# Patient Record
Sex: FEMALE | Race: WHITE | NOT HISPANIC OR LATINO | Employment: FULL TIME | URBAN - METROPOLITAN AREA
[De-identification: names, ages, dates, MRNs, and addresses within clinical notes are randomized per-mention and may not be internally consistent; named-entity substitution may affect disease eponyms.]

---

## 2021-11-05 ENCOUNTER — OFFICE VISIT (OUTPATIENT)
Dept: ORTHOPEDIC SURGERY | Facility: CLINIC | Age: 61
End: 2021-11-05

## 2021-11-05 VITALS — HEIGHT: 62 IN | WEIGHT: 150.2 LBS | BODY MASS INDEX: 27.64 KG/M2 | TEMPERATURE: 98 F

## 2021-11-05 DIAGNOSIS — M25.512 BILATERAL SHOULDER PAIN, UNSPECIFIED CHRONICITY: Primary | ICD-10-CM

## 2021-11-05 DIAGNOSIS — M19.019 GLENOHUMERAL ARTHRITIS: ICD-10-CM

## 2021-11-05 DIAGNOSIS — M89.9 LESION OF BONE OF RIGHT SHOULDER: ICD-10-CM

## 2021-11-05 DIAGNOSIS — M25.511 BILATERAL SHOULDER PAIN, UNSPECIFIED CHRONICITY: Primary | ICD-10-CM

## 2021-11-05 PROCEDURE — 20610 DRAIN/INJ JOINT/BURSA W/O US: CPT | Performed by: ORTHOPAEDIC SURGERY

## 2021-11-05 PROCEDURE — 99204 OFFICE O/P NEW MOD 45 MIN: CPT | Performed by: ORTHOPAEDIC SURGERY

## 2021-11-05 PROCEDURE — 73030 X-RAY EXAM OF SHOULDER: CPT | Performed by: ORTHOPAEDIC SURGERY

## 2021-11-05 RX ORDER — GABAPENTIN 300 MG/1
300 CAPSULE ORAL
COMMUNITY
Start: 2021-08-09 | End: 2023-02-10

## 2021-11-05 RX ORDER — METHYLPREDNISOLONE ACETATE 80 MG/ML
80 INJECTION, SUSPENSION INTRA-ARTICULAR; INTRALESIONAL; INTRAMUSCULAR; SOFT TISSUE
Status: COMPLETED | OUTPATIENT
Start: 2021-11-05 | End: 2021-11-05

## 2021-11-05 RX ORDER — LIDOCAINE HYDROCHLORIDE 20 MG/ML
4 INJECTION, SOLUTION EPIDURAL; INFILTRATION; INTRACAUDAL; PERINEURAL
Status: COMPLETED | OUTPATIENT
Start: 2021-11-05 | End: 2021-11-05

## 2021-11-05 RX ORDER — TRAMADOL HYDROCHLORIDE 50 MG/1
50 TABLET ORAL DAILY
COMMUNITY
Start: 2021-09-30

## 2021-11-05 RX ADMIN — LIDOCAINE HYDROCHLORIDE 4 ML: 20 INJECTION, SOLUTION EPIDURAL; INFILTRATION; INTRACAUDAL; PERINEURAL at 15:11

## 2021-11-05 RX ADMIN — METHYLPREDNISOLONE ACETATE 80 MG: 80 INJECTION, SUSPENSION INTRA-ARTICULAR; INTRALESIONAL; INTRAMUSCULAR; SOFT TISSUE at 15:11

## 2021-11-05 NOTE — PROGRESS NOTES
New bilateral Shoulders      Patient: Ellyn Sanchez        YOB: 1960    Medical Record Number: [id number]        Chief Complaints: bilateral shoulder pain      History of Present Illness: This is a 61-year-old female who presents complaining of bilateral shoulder pain she is right-hand dominant right is much worse than left she states this is been ongoing since 2015 she saw Dr. Richard and then after that saw Dr. Bradford.  She is never had an injection she was told she had arthritis after an MRI and that there was nothing they could do until she needed a shoulder replacement.  Right is an 8 out of 10 for his left is a 4 out of 10 symptoms are severe 8 out of 10 shooting aching burning worse with activity somewhat better with rest her past medical history is remarkable for thyroid disease      Allergies:   Allergies   Allergen Reactions   • Celecoxib GI Intolerance       Medications:   Home Medications:  Current Outpatient Medications on File Prior to Visit   Medication Sig   • fluticasone (FLONASE) 50 MCG/ACT nasal spray 1 spray into each nostril daily as needed. Spray in each nostril.   • gabapentin (NEURONTIN) 300 MG capsule Take 300 mg by mouth every night at bedtime.   • levothyroxine (SYNTHROID, LEVOTHROID) 75 MCG tablet TAKE 1 TABLET BY MOUTH ONE TIME A DAY    • Naproxen Sodium (ALEVE) 220 MG capsule Take 2 capsules by mouth daily as needed.   • traMADol (ULTRAM) 50 MG tablet Take 50 mg by mouth Daily.   • methocarbamol (ROBAXIN) 750 MG tablet Take 2 tablets three or four times daily as needed for back pain.   • naproxen (EC NAPROSYN) 500 MG EC tablet May take 1 tablet twice daily with food as needed for pain.   • valACYclovir (VALTREX) 500 MG tablet Take  by mouth. Take as directed for cold sores and genital herpes.     No current facility-administered medications on file prior to visit.     Current Medications:  Scheduled Meds:  Continuous Infusions:No current facility-administered  "medications for this visit.    PRN Meds:.    Past Medical History:   Diagnosis Date   • Disease of thyroid gland         Past Surgical History:   Procedure Laterality Date   • APPENDECTOMY          Social History     Occupational History   • Not on file   Tobacco Use   • Smoking status: Former Smoker   • Smokeless tobacco: Never Used   Vaping Use   • Vaping Use: Never used   Substance and Sexual Activity   • Alcohol use: Yes     Comment: Socially   • Drug use: Defer   • Sexual activity: Defer      Social History     Social History Narrative   • Not on file        Family History   Problem Relation Age of Onset   • Dementia Mother    • Diabetes Mother    • Heart disease Mother    • COPD Father         with Lewy bodies   • Diabetes Father    • Heart disease Father    • Hypertension Father    • Obesity Father         Morbid   • Arthritis Father    • Cancer Father         big toe   • Diabetes Maternal Grandmother    • Diabetes Sister    • Heart disease Brother              Review of Systems: 14 point review of systems are marked for the pertinent positives listed in the chart by the patient remainder negative    Review of Systems      Physical Exam: 61 y.o. female  General Appearance:    Alert, cooperative, in no acute distress                   Vitals:    11/05/21 1456   Temp: 98 °F (36.7 °C)   Weight: 68.1 kg (150 lb 3.2 oz)   Height: 157.5 cm (62\")   PainSc:   8      Patient is alert and read ×3 no acute distress appears her above-listed at height weight and age.  Affect is normal respiratory rate is normal unlabored. Heart rate regular rate rhythm, sclera, dentition and hearing are normal for the purpose of this exam.    Ortho Exam Physical exam the right shoulder reveals no overlying skin changes no lymphedema lymphadenopathy the patient can actively flex to about 150 passively I get them to 160 abduction is similar external rotation is 40 internal rotation to there buttock.  Rotator cuff strength is 4+ over 5 with " isometric strength testing no overlying skin changes.  Patient has reasonable cervical range of motion for their age no radicular symptoms and a normal elbow exam.  There are good distal pulses.    Physical exam of the left shoulder reveals no overlying skin changes no lymphedema no lymphadenopathy.  Patient has active flexion 170 with mild symptoms abduction is similar external rotation is to 45 and internal rotation to the upper lumbar spine with mild symptoms.  Patient has good rotator cuff strength 4+ over 5 with isometric strength testing with pain.  Patient has a positive impingement and a positive White sign.  Patient has good cervical range of motion which is full and asymptomatic no radicular symptoms.  Patient has a normal elbow exam.  Good distal pulses are presentPatient has pain with overhead activity and a positive Neer sign and a positive empty can sign  They have a positive drop arm any definitive painful arc    Large Joint Arthrocentesis: L glenohumeral  Date/Time: 11/5/2021 3:11 PM  Consent given by: patient  Site marked: site marked  Timeout: Immediately prior to procedure a time out was called to verify the correct patient, procedure, equipment, support staff and site/side marked as required   Supporting Documentation  Indications: pain   Procedure Details  Location: shoulder - L glenohumeral  Preparation: Patient was prepped and draped in the usual sterile fashion  Needle size: 22 G  Approach: posterior  Medications administered: 80 mg methylPREDNISolone acetate 80 MG/ML; 4 mL lidocaine PF 2% 2 %  Patient tolerance: patient tolerated the procedure well with no immediate complications    Large Joint Arthrocentesis: R glenohumeral  Date/Time: 11/5/2021 3:11 PM  Consent given by: patient  Site marked: site marked  Timeout: Immediately prior to procedure a time out was called to verify the correct patient, procedure, equipment, support staff and site/side marked as required   Supporting  Documentation  Indications: pain   Procedure Details  Location: shoulder - R glenohumeral  Preparation: Patient was prepped and draped in the usual sterile fashion  Needle size: 22 G  Approach: posterior  Medications administered: 80 mg methylPREDNISolone acetate 80 MG/ML; 4 mL lidocaine PF 2% 2 %  Patient tolerance: patient tolerated the procedure well with no immediate complications                Radiology:   AP, Scapular Y and Axillary Lateral of the right and left shoulder were ordered/reviewed to evauate shoulder pain.  She does have degenerative changes right is much worse than left with marked narrowing of her glenohumeral space on the left she has a large osteophyte sitting inferiorly but better maintenance of her glenohumeral space.  On the right she does have a small lytic lesion in the scapula that looks like it may have a little bit of sclerosis around it but I think we should work this up  Imaging Results (Most Recent)     Procedure Component Value Units Date/Time    XR Shoulder 2+ View Bilateral [[id number]] Resulted: 11/05/21 1359     Updated: 11/05/21 1459    Impression:      Ordering physician's impression is located in the Encounter Note dated 11/05/21. X-ray performed in the DR room.          Assessment/Plan: Bilateral shoulder pain I do think is degenerative in origin I think she deftly benefit from an injection will do a glenohumeral injection on both I will work-up the right shoulder with MR with IV contrast  Cortisone Injection. See procedure note.  Cortisone Injection for DIAGNOSTIC and THERAPUTIC purposes.

## 2021-12-10 ENCOUNTER — HOSPITAL ENCOUNTER (OUTPATIENT)
Dept: MRI IMAGING | Facility: HOSPITAL | Age: 61
Discharge: HOME | End: 2021-12-10
Admitting: ORTHOPAEDIC SURGERY

## 2021-12-10 DIAGNOSIS — M89.9 LESION OF BONE OF RIGHT SHOULDER: ICD-10-CM

## 2021-12-10 PROCEDURE — 0 GADOBENATE DIMEGLUMINE 529 MG/ML SOLUTION: Performed by: ORTHOPAEDIC SURGERY

## 2021-12-10 PROCEDURE — A9577 INJ MULTIHANCE: HCPCS | Performed by: ORTHOPAEDIC SURGERY

## 2021-12-10 PROCEDURE — 73223 MRI JOINT UPR EXTR W/O&W/DYE: CPT

## 2021-12-10 RX ADMIN — GADOBENATE DIMEGLUMINE 14 ML: 529 INJECTION, SOLUTION INTRAVENOUS at 14:26

## 2021-12-13 ENCOUNTER — TELEPHONE (OUTPATIENT)
Dept: ORTHOPEDIC SURGERY | Facility: CLINIC | Age: 61
End: 2021-12-13

## 2021-12-13 NOTE — TELEPHONE ENCOUNTER
----- Message from Samantha Wan MD sent at 12/13/2021  7:48 AM EST -----  Please tell her there are no bony lesions all of that looks good, rotator cuff looks good.  She has significant arthritis in the glenohumeral joint.  Treatment for that would be injections and if that fails replacement

## 2022-01-28 ENCOUNTER — TELEPHONE (OUTPATIENT)
Dept: ORTHOPEDIC SURGERY | Facility: CLINIC | Age: 62
End: 2022-01-28

## 2022-01-28 NOTE — TELEPHONE ENCOUNTER
Caller: CAITIE CROWDER    Relationship: SELF     Best call back number: [phone]    What was the call regarding: PATIENT  IS REQUESTING TO SCHEDULE BILATERAL SHOULDER INJECTIONS     Do you require a callback: YES

## 2022-02-16 NOTE — PROGRESS NOTES
Patient: Ellyn Sanchez  YOB: 1960  Date of Service: 2/16/2022    Chief Complaints: Bilateral shoulder pain    Subjective:    History of Present Illness: Pt is seen in the office today with complaints of bilateral shoulder pain she has known degenerative changes gets intermittent injections as well with those she is not interested in anything surgical at this time.          Allergies:   Allergies   Allergen Reactions   • Celecoxib GI Intolerance       Medications:   Home Medications:  Current Outpatient Medications on File Prior to Visit   Medication Sig   • fluticasone (FLONASE) 50 MCG/ACT nasal spray 1 spray into each nostril daily as needed. Spray in each nostril.   • gabapentin (NEURONTIN) 300 MG capsule Take 300 mg by mouth every night at bedtime.   • levothyroxine (SYNTHROID, LEVOTHROID) 75 MCG tablet TAKE 1 TABLET BY MOUTH ONE TIME A DAY    • methocarbamol (ROBAXIN) 750 MG tablet Take 2 tablets three or four times daily as needed for back pain.   • naproxen (EC NAPROSYN) 500 MG EC tablet May take 1 tablet twice daily with food as needed for pain.   • traMADol (ULTRAM) 50 MG tablet Take 50 mg by mouth Daily.   • valACYclovir (VALTREX) 500 MG tablet Take  by mouth. Take as directed for cold sores and genital herpes.     No current facility-administered medications on file prior to visit.     Current Medications:  Scheduled Meds:  Continuous Infusions:No current facility-administered medications for this visit.    PRN Meds:.    I have reviewed the patient's medical history in detail and updated the computerized patient record.  Review and summarization of old records include:    Past Medical History:   Diagnosis Date   • Disease of thyroid gland    • Kidney stone         Past Surgical History:   Procedure Laterality Date   • APPENDECTOMY     • KIDNEY STONE SURGERY  2017        Social History     Occupational History   • Not on file   Tobacco Use   • Smoking status: Former Smoker   • Smokeless  tobacco: Never Used   Vaping Use   • Vaping Use: Never used   Substance and Sexual Activity   • Alcohol use: Yes     Comment: Socially   • Drug use: Defer   • Sexual activity: Defer      Social History     Social History Narrative   • Not on file        Family History   Problem Relation Age of Onset   • Dementia Mother    • Diabetes Mother    • Heart disease Mother    • COPD Father         with Lewy bodies   • Diabetes Father    • Heart disease Father    • Hypertension Father    • Obesity Father         Morbid   • Arthritis Father    • Cancer Father         big toe   • Diabetes Maternal Grandmother    • Diabetes Sister    • Heart disease Brother        ROS: 14 point review of systems was performed and was negative except for documented findings in HPI and today's encounter.     Allergies:   Allergies   Allergen Reactions   • Celecoxib GI Intolerance     Constitutional:  Denies fever, shaking or chills   Eyes:  Denies change in visual acuity   HENT:  Denies nasal congestion or sore throat   Respiratory:  Denies cough or shortness of breath   Cardiovascular:  Denies chest pain or severe LE edema   GI:  Denies abdominal pain, nausea, vomiting, bloody stools or diarrhea   Musculoskeletal:  Numbness, tingling, or loss of motor function only as noted above in history of present illness.  : Denies painful urination or hematuria  Integument:  Denies rash, lesion or ulceration   Neurologic:  Denies headache or focal weakness  Endocrine:  Denies lymphadenopathy  Psych:  Denies confusion or change in mental status   Hem:  Denies active bleeding      Physical Exam: 61 y.o. female  Wt Readings from Last 3 Encounters:   11/05/21 68.1 kg (150 lb 3.2 oz)   09/24/15 59 kg (130 lb 0.1 oz)   07/20/15 60.9 kg (134 lb 4.2 oz)       There is no height or weight on file to calculate BMI.  No height and weight on file for this encounter.  There were no vitals filed for this visit.  Vital signs reviewed.   General Appearance:    Alert,  cooperative, in no acute distress                    Ortho exam    Exam unchanged         .time    Assessment: Bilateral shoulder DJD    Plan: Injection she understands her options of arthroplasty  Follow up as indicated.  Ice, elevate, and rest as needed.  Discussed conservative measures of pain control including ice, bracing.  Also talked about the importance of strengthening and maintaining ideal body weight    Samantha Wan M.D.    Large Joint Arthrocentesis: L glenohumeral  Date/Time: 2/18/2022 9:45 AM  Consent given by: patient  Site marked: site marked  Timeout: Immediately prior to procedure a time out was called to verify the correct patient, procedure, equipment, support staff and site/side marked as required   Supporting Documentation  Indications: pain   Procedure Details  Location: shoulder - L glenohumeral  Preparation: Patient was prepped and draped in the usual sterile fashion  Needle gauge: 21G.  Approach: posterior  Medications administered: 80 mg methylPREDNISolone acetate 80 MG/ML; 4 mL lidocaine (cardiac)  Patient tolerance: patient tolerated the procedure well with no immediate complications    Large Joint Arthrocentesis: R glenohumeral  Date/Time: 2/18/2022 9:46 AM  Consent given by: patient  Site marked: site marked  Timeout: Immediately prior to procedure a time out was called to verify the correct patient, procedure, equipment, support staff and site/side marked as required   Supporting Documentation  Indications: pain   Procedure Details  Location: shoulder - R glenohumeral  Preparation: Patient was prepped and draped in the usual sterile fashion  Needle gauge: 21G.  Approach: posterior  Medications administered: 80 mg methylPREDNISolone acetate 80 MG/ML; 4 mL lidocaine (cardiac)  Patient tolerance: patient tolerated the procedure well with no immediate complications

## 2022-02-18 ENCOUNTER — CLINICAL SUPPORT (OUTPATIENT)
Dept: ORTHOPEDIC SURGERY | Facility: CLINIC | Age: 62
End: 2022-02-18

## 2022-02-18 VITALS — HEIGHT: 62 IN | RESPIRATION RATE: 16 BRPM | WEIGHT: 150 LBS | BODY MASS INDEX: 27.6 KG/M2 | TEMPERATURE: 97.3 F

## 2022-02-18 DIAGNOSIS — M19.019 GLENOHUMERAL ARTHRITIS: Primary | ICD-10-CM

## 2022-02-18 PROCEDURE — 20610 DRAIN/INJ JOINT/BURSA W/O US: CPT | Performed by: ORTHOPAEDIC SURGERY

## 2022-02-18 RX ORDER — METHYLPREDNISOLONE ACETATE 80 MG/ML
80 INJECTION, SUSPENSION INTRA-ARTICULAR; INTRALESIONAL; INTRAMUSCULAR; SOFT TISSUE
Status: COMPLETED | OUTPATIENT
Start: 2022-02-18 | End: 2022-02-18

## 2022-02-18 RX ORDER — AZELASTINE HCL 205.5 UG/1
2 SPRAY NASAL 2 TIMES DAILY
COMMUNITY
Start: 2022-02-02

## 2022-02-18 RX ORDER — GABAPENTIN 600 MG/1
600 TABLET ORAL
COMMUNITY
Start: 2022-02-10

## 2022-02-18 RX ADMIN — METHYLPREDNISOLONE ACETATE 80 MG: 80 INJECTION, SUSPENSION INTRA-ARTICULAR; INTRALESIONAL; INTRAMUSCULAR; SOFT TISSUE at 09:45

## 2022-02-18 RX ADMIN — METHYLPREDNISOLONE ACETATE 80 MG: 80 INJECTION, SUSPENSION INTRA-ARTICULAR; INTRALESIONAL; INTRAMUSCULAR; SOFT TISSUE at 09:46

## 2022-05-31 ENCOUNTER — TELEPHONE (OUTPATIENT)
Dept: ORTHOPEDIC SURGERY | Facility: CLINIC | Age: 62
End: 2022-05-31

## 2022-05-31 NOTE — TELEPHONE ENCOUNTER
Caller: Ellyn Sanchez    Relationship to patient: Self    Best call back number:     Chief complaint: BILAT SHOULDER INJECTION     Type of visit: P

## 2022-06-03 ENCOUNTER — CLINICAL SUPPORT (OUTPATIENT)
Dept: ORTHOPEDIC SURGERY | Facility: CLINIC | Age: 62
End: 2022-06-03

## 2022-06-03 VITALS — TEMPERATURE: 97.2 F | HEIGHT: 62 IN | BODY MASS INDEX: 27.53 KG/M2 | WEIGHT: 149.6 LBS

## 2022-06-03 DIAGNOSIS — M19.019 GLENOHUMERAL ARTHRITIS: Primary | ICD-10-CM

## 2022-06-03 PROCEDURE — 20610 DRAIN/INJ JOINT/BURSA W/O US: CPT | Performed by: ORTHOPAEDIC SURGERY

## 2022-06-03 RX ADMIN — LIDOCAINE HYDROCHLORIDE 4 ML: 20 INJECTION, SOLUTION EPIDURAL; INFILTRATION; INTRACAUDAL; PERINEURAL at 08:10

## 2022-06-03 RX ADMIN — METHYLPREDNISOLONE ACETATE 80 MG: 80 INJECTION, SUSPENSION INTRA-ARTICULAR; INTRALESIONAL; INTRAMUSCULAR; SOFT TISSUE at 08:09

## 2022-06-03 RX ADMIN — METHYLPREDNISOLONE ACETATE 80 MG: 80 INJECTION, SUSPENSION INTRA-ARTICULAR; INTRALESIONAL; INTRAMUSCULAR; SOFT TISSUE at 08:10

## 2022-06-03 RX ADMIN — LIDOCAINE HYDROCHLORIDE 4 ML: 20 INJECTION, SOLUTION EPIDURAL; INFILTRATION; INTRACAUDAL; PERINEURAL at 08:09

## 2022-06-03 NOTE — PROGRESS NOTES
Patient: Ellyn Sanchez  YOB: 1960  Date of Service: 6/3/2022    Chief Complaints: Bilateral shoulder pain    Subjective:    History of Present Illness: Pt is seen in the office today with complaints of bilateral shoulder pain she gets intermittent injections she has known degenerative changes she is not interested in think surgical but does understand her options.          Allergies:   Allergies   Allergen Reactions   • Celecoxib GI Intolerance       Medications:   Home Medications:  Current Outpatient Medications on File Prior to Visit   Medication Sig   • azelastine (ASTEPRO) 0.15 % solution nasal spray 2 sprays 2 (Two) Times a Day.   • fluticasone (FLONASE) 50 MCG/ACT nasal spray 1 spray into each nostril daily as needed. Spray in each nostril.   • gabapentin (NEURONTIN) 300 MG capsule Take 300 mg by mouth every night at bedtime.   • gabapentin (NEURONTIN) 600 MG tablet Take 600 mg by mouth every night at bedtime.   • levothyroxine (SYNTHROID, LEVOTHROID) 75 MCG tablet TAKE 1 TABLET BY MOUTH ONE TIME A DAY    • methocarbamol (ROBAXIN) 750 MG tablet Take 2 tablets three or four times daily as needed for back pain.   • naproxen (EC NAPROSYN) 500 MG EC tablet May take 1 tablet twice daily with food as needed for pain.   • traMADol (ULTRAM) 50 MG tablet Take 50 mg by mouth Daily.   • valACYclovir (VALTREX) 500 MG tablet Take  by mouth. Take as directed for cold sores and genital herpes.     No current facility-administered medications on file prior to visit.     Current Medications:  Scheduled Meds:  Continuous Infusions:No current facility-administered medications for this visit.    PRN Meds:.    I have reviewed the patient's medical history in detail and updated the computerized patient record.  Review and summarization of old records include:    Past Medical History:   Diagnosis Date   • Disease of thyroid gland    • Kidney stone         Past Surgical History:   Procedure Laterality Date   •  APPENDECTOMY     • KIDNEY STONE SURGERY  2017        Social History     Occupational History   • Not on file   Tobacco Use   • Smoking status: Former Smoker   • Smokeless tobacco: Never Used   Vaping Use   • Vaping Use: Never used   Substance and Sexual Activity   • Alcohol use: Yes     Comment: Socially   • Drug use: Defer   • Sexual activity: Defer      Social History     Social History Narrative   • Not on file        Family History   Problem Relation Age of Onset   • Dementia Mother    • Diabetes Mother    • Heart disease Mother    • COPD Father         with Lewy bodies   • Diabetes Father    • Heart disease Father    • Hypertension Father    • Obesity Father         Morbid   • Arthritis Father    • Cancer Father         big toe   • Diabetes Maternal Grandmother    • Diabetes Sister    • Heart disease Brother        ROS: 14 point review of systems was performed and was negative except for documented findings in HPI and today's encounter.     Allergies:   Allergies   Allergen Reactions   • Celecoxib GI Intolerance     Constitutional:  Denies fever, shaking or chills   Eyes:  Denies change in visual acuity   HENT:  Denies nasal congestion or sore throat   Respiratory:  Denies cough or shortness of breath   Cardiovascular:  Denies chest pain or severe LE edema   GI:  Denies abdominal pain, nausea, vomiting, bloody stools or diarrhea   Musculoskeletal:  Numbness, tingling, or loss of motor function only as noted above in history of present illness.  : Denies painful urination or hematuria  Integument:  Denies rash, lesion or ulceration   Neurologic:  Denies headache or focal weakness  Endocrine:  Denies lymphadenopathy  Psych:  Denies confusion or change in mental status   Hem:  Denies active bleeding      Physical Exam: 61 y.o. female  Wt Readings from Last 3 Encounters:   02/18/22 68 kg (150 lb)   11/05/21 68.1 kg (150 lb 3.2 oz)   09/24/15 59 kg (130 lb 0.1 oz)       There is no height or weight on file to  calculate BMI.  No height and weight on file for this encounter.  There were no vitals filed for this visit.  Vital signs reviewed.   General Appearance:    Alert, cooperative, in no acute distress                    Ortho exam    Exam is unchanged         .time    Assessment: Bilateral shoulder DJD I did review x-rays from last visit she has degenerative changes in both    Plan: Injections in both she understands her surgical options  Follow up as indicated.  Ice, elevate, and rest as needed.  Discussed conservative measures of pain control including ice, bracing.  Also talked about the importance of strengthening   Samantha Wan M.D.      Large Joint Arthrocentesis: L glenohumeral  Date/Time: 6/3/2022 8:09 AM  Consent given by: patient  Site marked: site marked  Timeout: Immediately prior to procedure a time out was called to verify the correct patient, procedure, equipment, support staff and site/side marked as required   Supporting Documentation  Indications: pain   Procedure Details  Location: shoulder - L glenohumeral  Preparation: Patient was prepped and draped in the usual sterile fashion  Needle gauge: 21G.  Approach: posterior  Medications administered: 80 mg methylPREDNISolone acetate 80 MG/ML; 4 mL lidocaine PF 2% 2 %  Patient tolerance: patient tolerated the procedure well with no immediate complications    Large Joint Arthrocentesis: R glenohumeral  Date/Time: 6/3/2022 8:10 AM  Consent given by: patient  Site marked: site marked  Timeout: Immediately prior to procedure a time out was called to verify the correct patient, procedure, equipment, support staff and site/side marked as required   Supporting Documentation  Indications: pain   Procedure Details  Location: shoulder - R glenohumeral  Preparation: Patient was prepped and draped in the usual sterile fashion  Needle gauge: 21G.  Approach: posterior  Medications administered: 80 mg methylPREDNISolone acetate 80 MG/ML; 4 mL lidocaine PF 2% 2  %  Patient tolerance: patient tolerated the procedure well with no immediate complications

## 2022-06-07 RX ORDER — METHYLPREDNISOLONE ACETATE 80 MG/ML
80 INJECTION, SUSPENSION INTRA-ARTICULAR; INTRALESIONAL; INTRAMUSCULAR; SOFT TISSUE
Status: COMPLETED | OUTPATIENT
Start: 2022-06-03 | End: 2022-06-03

## 2022-06-07 RX ORDER — LIDOCAINE HYDROCHLORIDE 20 MG/ML
4 INJECTION, SOLUTION EPIDURAL; INFILTRATION; INTRACAUDAL; PERINEURAL
Status: COMPLETED | OUTPATIENT
Start: 2022-06-03 | End: 2022-06-03

## 2022-08-19 ENCOUNTER — TELEPHONE (OUTPATIENT)
Dept: ORTHOPEDIC SURGERY | Facility: CLINIC | Age: 62
End: 2022-08-19

## 2022-08-19 NOTE — TELEPHONE ENCOUNTER
HUB AGENT ATTEMPTED NON-CLINICAL WARM TRANSFER - NO ANSWER     Caller: Ellyn Sanchez    Relationship to patient: Self    Best call back number: [phone]     Type of visit: FOLLOW UP / BILATERAL SHOULDERS, LEFT > RIGHT / NO NEW INJURIES / WANTS INJECTION (PRIOR BILATERAL SHOULDERS CORTISONE 06-03-22)    Requested date: FRI 09-02-22 PREFERS MORNINGS @ [facility] REQUESTED [facility]     PLEASE CALL / LEAVE VMAIL TO DISCUSS SCHEDULING     THANKS

## 2022-08-24 ENCOUNTER — TELEPHONE (OUTPATIENT)
Dept: ORTHOPEDIC SURGERY | Facility: CLINIC | Age: 62
End: 2022-08-24

## 2022-08-24 NOTE — TELEPHONE ENCOUNTER
Caller: CAITIE CROWDER    Relationship to patient: SELF    Best call back number: [phone]    Chief complaint: BILATERAL SHOULDER PAIN    Type of visit: INJECTION    Requested date: 09/22/22     If rescheduling, when is the original appointment: 09/02/2022 JUST A LITTLE LATER IN THE DAY     Additional notes:PATIENT REQ APPT ON SAME DATE OF 09/02/2022 BUT LATER IN THE DAY

## 2022-09-01 NOTE — PROGRESS NOTES
[facility] [place] [place] [facility]  Follow Up Visit      Patient Name: Ellyn Sanchez  : 1960  Primary Care Physician: Irene Wilson APRN        Chief Complaint: Bilateral shoulder pain     HPI:   Ellyn Sanchez is a 62 y.o. year old who presents today for bilateral shoulder pain.  Patient has a history of chronic bilateral shoulder pain and glenohumeral arthritis.  In the past she seen Dr. Wan for her symptoms, she comes in today with reports that her symptoms are worsening particularly the left shoulder.  Historically her right shoulder was more symptomatic.  She has been taking 2 Aleve a day, she takes gabapentin at bedtime as well as tramadol all without much relief in her symptoms.  Her symptoms are constant, pretty much all day long and she does have nighttime symptoms.  We elected to go ahead and get new x-rays today for further evaluation and treatment.  She has no specific radicular pattern, she does have a little neck pain sometimes.    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  She does feel this likely exacerbates her symptoms.    ROS:  ROS negative except as listed in the HPI.    Allergies:   Allergies   Allergen Reactions   • Celecoxib GI Intolerance       Medications:  Current Outpatient Medications on File Prior to Visit   Medication Sig   • azelastine (ASTEPRO) 0.15 % solution nasal spray 2 sprays 2 (Two) Times a Day.   • fluticasone (FLONASE) 50 MCG/ACT nasal spray 1 spray into each nostril daily as needed. Spray in each nostril.   • gabapentin (NEURONTIN) 600 MG tablet Take 600 mg by mouth every night at bedtime.   • levothyroxine (SYNTHROID, LEVOTHROID) 75 MCG tablet TAKE 1 TABLET BY MOUTH ONE TIME A DAY    • traMADol (ULTRAM) 50 MG tablet Take 50 mg by mouth Daily.   • valACYclovir (VALTREX) 500 MG tablet Take  by mouth. Take as directed for cold sores and genital herpes.   • gabapentin  (NEURONTIN) 300 MG capsule Take 300 mg by mouth every night at bedtime.   • methocarbamol (ROBAXIN) 750 MG tablet Take 2 tablets three or four times daily as needed for back pain.   • naproxen (EC NAPROSYN) 500 MG EC tablet May take 1 tablet twice daily with food as needed for pain.     No current facility-administered medications on file prior to visit.       Physical Exam:   62 y.o. female  Body mass index is 27.55 kg/m²., 68.3 kg (150 lb 9.6 oz)  Vitals:    09/02/22 1052   Temp: 97.7 °F (36.5 °C)     General: Alert, cooperative, appears well and in no observable distress. Appears stated age and BMI as listed above.  Respiratory: Normal respiratory effort.  Skin: Warm & well perfused; appropriate skin turgor.  Psych: Appropriate mood & affect.    MSK Exam:    Bilateral Shoulder  No obvious deformities or wounds.   No redness or significant swelling.  ROM is limited with a FIRM endpoint.  Flexion 160  ER 60  IR SI region  4+ to 5/5 strength with isometric testing of the rotator cuff.      Brief examination of the cervical spine did not reproduce any specific radicular pattern or symptoms.   Strength is reasonable and symmetric in the upper extremities.      Radiology:    The following X-rays were ordered/reviewed today to evaluate the patient's symptoms: Shoulder: AP, Scapular Y and Axillary Lateral of both shoulder(s) show Degenerative changes of both glenohumeral joints.  I do think overall the right shoulder appears very similar to x-rays taken nearly 10 months ago.  May be perhaps very subtle interval progression.  The left shoulder appears to have more significant changes over the last 10 months with more narrowing noted on the axillary view in the glenohumeral space.  Otherwise no NEW specific acute bony pathology to account for her report of increased pain..    Procedure:   See Procedure Note: The potential risks and benefits of performing a diagnostic and therapeutic injection were discussed with the  patient prior to procedure. Risks include, but are not limited to infection, swelling, transient increase in pain, bleeding, bruising. Patient was advised that injections are a diagnostic and therapeutic tool meaning they may not alleviate symptoms at all, or may only provide partial or temporary relief. Injection precautions and aftercare discussed.      [facility]. Data/Labs: N/A    Assessment & Plan:    This is a 62-year-old female with chronic bilateral shoulder pain and known degenerative changes.  I do think she has had a progression in the arthritis particularly the left shoulder.  Her strength is good on her examination which makes me less suspicious for rotator cuff pathology but I would consider that in the differential and might even consider 1 additional injection into the subacromial space to see if that may alleviate her symptoms a bit better.  She fails to get any relief with these injections this time we might consider an MRI just to rule out any other specific shoulder pathology.  She will continue her usual regimen for pain, she has been icing daily as well.  We will see her back in about 3 months I did tell her to call me sooner if she does not get good relief.          ICD-10-CM ICD-9-CM   1. Bilateral shoulder pain, unspecified chronicity  M25.511 719.41    M25.512    2. Arthritis of both glenohumeral joints  M19.011 716.91    M19.012      No orders of the defined types were placed in this encounter.    Orders Placed This Encounter   Procedures   • Large Joint Arthrocentesis: R glenohumeral   • Large Joint Arthrocentesis: L glenohumeral   • XR Shoulder 2+ View Bilateral     Return in about 3 months (around 12/2/2022).    Patient encouraged to call with questions or concerns prior to follow up.  Recommend ICE and/or HEAT PRN as discussed.  Will discuss with attending physician as needed.  Consider additional referrals, work up and/or advanced imaging as indicated or if patient fails to respond to  conservative care.        VAMSHI Mendoza      Dictated Utilizing Dragon Dictation  Large Joint Arthrocentesis: R glenohumeral  Date/Time: 9/2/2022 10:53 AM  Consent given by: patient  Site marked: site marked  Timeout: Immediately prior to procedure a time out was called to verify the correct patient, procedure, equipment, support staff and site/side marked as required   Supporting Documentation  Indications: pain   Procedure Details  Location: shoulder - R glenohumeral  Preparation: Patient was prepped and draped in the usual sterile fashion  Needle gauge: 21G.  Approach: posterior  Medications administered: 2 mL lidocaine (cardiac); 80 mg methylPREDNISolone acetate 40 MG/ML  Patient tolerance: patient tolerated the procedure well with no immediate complications    Large Joint Arthrocentesis: L glenohumeral  Date/Time: 9/2/2022 10:54 AM  Consent given by: patient  Site marked: site marked  Timeout: Immediately prior to procedure a time out was called to verify the correct patient, procedure, equipment, support staff and site/side marked as required   Supporting Documentation  Indications: pain   Procedure Details  Location: shoulder - L glenohumeral  Preparation: Patient was prepped and draped in the usual sterile fashion  Needle gauge: 21G.  Approach: posterior  Medications administered: 2 mL lidocaine (cardiac); 80 mg methylPREDNISolone acetate 40 MG/ML  Patient tolerance: patient tolerated the procedure well with no immediate complications

## 2022-09-02 ENCOUNTER — CLINICAL SUPPORT (OUTPATIENT)
Dept: ORTHOPEDIC SURGERY | Facility: CLINIC | Age: 62
End: 2022-09-02

## 2022-09-02 VITALS — WEIGHT: 150.6 LBS | BODY MASS INDEX: 27.71 KG/M2 | TEMPERATURE: 97.7 F | HEIGHT: 62 IN

## 2022-09-02 DIAGNOSIS — M19.012 ARTHRITIS OF BOTH GLENOHUMERAL JOINTS: ICD-10-CM

## 2022-09-02 DIAGNOSIS — M25.512 BILATERAL SHOULDER PAIN, UNSPECIFIED CHRONICITY: Primary | ICD-10-CM

## 2022-09-02 DIAGNOSIS — M25.511 BILATERAL SHOULDER PAIN, UNSPECIFIED CHRONICITY: Primary | ICD-10-CM

## 2022-09-02 DIAGNOSIS — M19.011 ARTHRITIS OF BOTH GLENOHUMERAL JOINTS: ICD-10-CM

## 2022-09-02 PROCEDURE — 99213 OFFICE O/P EST LOW 20 MIN: CPT | Performed by: NURSE PRACTITIONER

## 2022-09-02 PROCEDURE — 73030 X-RAY EXAM OF SHOULDER: CPT | Performed by: NURSE PRACTITIONER

## 2022-09-02 PROCEDURE — 20610 DRAIN/INJ JOINT/BURSA W/O US: CPT | Performed by: NURSE PRACTITIONER

## 2022-09-02 RX ORDER — METHYLPREDNISOLONE ACETATE 40 MG/ML
80 INJECTION, SUSPENSION INTRA-ARTICULAR; INTRALESIONAL; INTRAMUSCULAR; SOFT TISSUE
Status: COMPLETED | OUTPATIENT
Start: 2022-09-02 | End: 2022-09-02

## 2022-09-02 RX ADMIN — METHYLPREDNISOLONE ACETATE 80 MG: 40 INJECTION, SUSPENSION INTRA-ARTICULAR; INTRALESIONAL; INTRAMUSCULAR; SOFT TISSUE at 10:53

## 2022-09-02 RX ADMIN — METHYLPREDNISOLONE ACETATE 80 MG: 40 INJECTION, SUSPENSION INTRA-ARTICULAR; INTRALESIONAL; INTRAMUSCULAR; SOFT TISSUE at 10:54

## 2022-11-22 ENCOUNTER — TELEPHONE (OUTPATIENT)
Dept: ORTHOPEDIC SURGERY | Facility: CLINIC | Age: 62
End: 2022-11-22

## 2022-11-22 NOTE — TELEPHONE ENCOUNTER
DELETE AFTER REVIEWING: Telephone encounter to be sent to the clinical pool     Caller: Ellyn Sanchez    Relationship to patient: Self    Best call back number: [phone]    Chief complaint: BILATERAL SHOULDER    Type of visit: INJECTION    Requested date: 12/09 AFTER 12.30PM

## 2023-01-05 NOTE — PROGRESS NOTES
[facility] [place] [facility]  Follow Up Visit      Patient Name: Ellyn Sanchez  : 1960  Primary Care Physician: Irene Wilson APRN        Chief Complaint: Bilateral shoulder pain, new right knee pain    HPI:   Ellyn Sanchez is a 62 y.o. year old who presents today for bilateral shoulder pain and new right knee pain.  Patient has a history of chronic bilateral shoulder pain and known degenerative changes.  She gets intermittent injections with good relief in her symptoms.  She has been evaluated by both Dr. Wan and myself in the past she understands her surgical options and is still getting reasonable relief with intermittent injections.  She is really trying to time any more significant surgery closer to when she retires.  She takes Aleve twice a day as well as tramadol for more severe pain.  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  We elected to get new x-rays of her knees to better evaluate her today.      ROS:  ROS negative except as listed in the HPI.    Allergies:   Allergies   Allergen Reactions   • Celecoxib GI Intolerance       Medications:  Current Outpatient Medications on File Prior to Visit   Medication Sig   • azelastine (ASTEPRO) 0.15 % solution nasal spray 2 sprays 2 (Two) Times a Day.   • fluticasone (FLONASE) 50 MCG/ACT nasal spray 1 spray into each nostril daily as needed. Spray in each nostril.   • gabapentin (NEURONTIN) 600 MG tablet Take 600 mg by mouth every night at bedtime.   • levothyroxine (SYNTHROID, LEVOTHROID) 75 MCG tablet TAKE 1 TABLET BY MOUTH ONE TIME A DAY    • traMADol (ULTRAM) 50 MG tablet Take 50 mg by mouth Daily.   • valACYclovir (VALTREX) 500 MG tablet Take  by mouth. Take as directed for cold sores and genital herpes.   • gabapentin (NEURONTIN) 300 MG capsule Take 300 mg by mouth  every night at bedtime.   • methocarbamol (ROBAXIN) 750 MG tablet Take 2 tablets three or four times daily as needed for back pain.   • naproxen (EC NAPROSYN) 500 MG EC tablet May take 1 tablet twice daily with food as needed for pain.     No current facility-administered medications on file prior to visit.       Physical Exam:   62 y.o. female  Body mass index is 28.2 kg/m²., 69.9 kg (154 lb 3.2 oz)  Vitals:    01/06/23 1348   Temp: 99.1 °F (37.3 °C)     General: Alert, cooperative, appears well and in no observable distress. Appears stated age and BMI as listed above.  Respiratory: Normal respiratory effort.  Skin: Warm & well perfused; appropriate skin turgor.  Psych: Appropriate mood & affect.    MSK Exam:  Physical exam of shoulders is unchanged.      Knee Exam:  Right   No deformity or wounds appreciated. No significant redness or warmth.  Trace effusion noted.  Tenderness along the joint line appreciated laterally.  ROM 0-130 with pain at terminal motion.  Ligamentous exam grossly stable.  Le + medial, mild +  Bounce Home -  Quad strength 4-4+/5    Brief hip exam in the affected extremity(ies) grossly unremarkable.  Moves ankle and toes up and down, no significant pain or swelling in the foot, ankle or calf.        Radiology:    The following X-rays were ordered/reviewed today to evaluate the patient's symptoms: Single Knee: AP standing and sunrise views of both knees, and lateral view of painful knee show Some subtle degenerative changesWith medial compartment narrowing bilaterally which appears symmetric.  She does have some mild patellofemoral arthritis sunrise view is reasonable.  Nothing acute appearing..    Procedure:   See Procedure Note: The potential risks and benefits of performing a diagnostic and therapeutic injection were discussed with the patient prior to procedure. Risks include, but are not limited to infection, swelling, transient increase in pain, bleeding, bruising. Patient was  advised that injections are a diagnostic and therapeutic tool meaning they may not alleviate symptoms at all, or may only provide partial or temporary relief. Injection precautions and aftercare discussed.    Large Joint Arthrocentesis: L glenohumeral  Date/Time: 1/6/2023 1:49 PM  Consent given by: patient  Site marked: site marked  Timeout: Immediately prior to procedure a time out was called to verify the correct patient, procedure, equipment, support staff and site/side marked as required   Supporting Documentation  Indications: pain   Procedure Details  Location: shoulder - L glenohumeral  Preparation: Patient was prepped and draped in the usual sterile fashion  Needle gauge: 21G.  Approach: posterior  Medications administered: 80 mg methylPREDNISolone acetate 80 MG/ML; 2 mL lidocaine PF 1% 1 %  Patient tolerance: patient tolerated the procedure well with no immediate complications    Large Joint Arthrocentesis: R glenohumeral  Date/Time: 1/6/2023 1:50 PM  Consent given by: patient  Site marked: site marked  Timeout: Immediately prior to procedure a time out was called to verify the correct patient, procedure, equipment, support staff and site/side marked as required   Supporting Documentation  Indications: pain   Procedure Details  Location: shoulder - R glenohumeral  Preparation: Patient was prepped and draped in the usual sterile fashion  Needle gauge: 21G.  Approach: posterior  Medications administered: 80 mg methylPREDNISolone acetate 80 MG/ML; 2 mL lidocaine PF 1% 1 %  Patient tolerance: patient tolerated the procedure well with no immediate complications          Misc. Data/Labs: N/A    Assessment & Plan:    ICD-10-CM ICD-9-CM   1. Bilateral shoulder pain, unspecified chronicity  M25.511 719.41    M25.512    2. Arthritis of both glenohumeral joints  M19.011 716.91    M19.012    3. Acute pain of right knee  M25.561 719.46     No orders of the defined types were placed in this encounter.    Orders Placed  This Encounter   Procedures   • Large Joint Arthrocentesis: L glenohumeral   • Large Joint Arthrocentesis: R glenohumeral   • XR Knee 3 View Right       This is a 62-year-old female with bilateral glenohumeral arthritis.  She gets intermittent injections with reasonable relief in her symptoms.  I think ultimately she understands she is heading towards considering shoulder replacement at some point in the future but she is doing well with conservative care still.  Will refer her onto Dr. Hoskins at the time she wishes to start considering surgery a bit more.    As far as her right knee her pain is mostly posterior somewhat lateral this could be meniscal pathology she is got some medial compartment narrowing but the pain seems to be isolated to the knee no dedicated hip or radicular complaints.  She did order an over-the-counter knee brace I think that would be a good option to try I will give her home exercise programs to work on some stretching and strengthening.  We talked about using topical Voltaren as well and she is already on anti-inflammatories.  I will see her back in about 3 months for her shoulders if her knee pain does not respond to the conservative care we might consider an injection or additional means of testing with an MRI.    Return in about 3 months (around 4/6/2023), or if symptoms worsen or fail to improve.    Patient encouraged to call with questions or concerns prior to follow up.  Recommend ICE and/or HEAT PRN as discussed.  Will discuss with attending physician as needed.  Consider additional referrals, work up and/or advanced imaging as indicated or if patient fails to respond to conservative care.        Jose Weeks, APRN

## 2023-01-06 ENCOUNTER — CLINICAL SUPPORT (OUTPATIENT)
Dept: ORTHOPEDIC SURGERY | Facility: CLINIC | Age: 63
End: 2023-01-06
Payer: COMMERCIAL

## 2023-01-06 VITALS — TEMPERATURE: 99.1 F | BODY MASS INDEX: 28.37 KG/M2 | WEIGHT: 154.2 LBS | HEIGHT: 62 IN

## 2023-01-06 DIAGNOSIS — M25.561 ACUTE PAIN OF RIGHT KNEE: ICD-10-CM

## 2023-01-06 DIAGNOSIS — M25.512 BILATERAL SHOULDER PAIN, UNSPECIFIED CHRONICITY: Primary | ICD-10-CM

## 2023-01-06 DIAGNOSIS — M25.511 BILATERAL SHOULDER PAIN, UNSPECIFIED CHRONICITY: Primary | ICD-10-CM

## 2023-01-06 DIAGNOSIS — M19.012 ARTHRITIS OF BOTH GLENOHUMERAL JOINTS: ICD-10-CM

## 2023-01-06 DIAGNOSIS — M19.011 ARTHRITIS OF BOTH GLENOHUMERAL JOINTS: ICD-10-CM

## 2023-01-06 PROCEDURE — 73562 X-RAY EXAM OF KNEE 3: CPT | Performed by: NURSE PRACTITIONER

## 2023-01-06 PROCEDURE — 99213 OFFICE O/P EST LOW 20 MIN: CPT | Performed by: NURSE PRACTITIONER

## 2023-01-06 PROCEDURE — 20610 DRAIN/INJ JOINT/BURSA W/O US: CPT | Performed by: NURSE PRACTITIONER

## 2023-01-06 RX ORDER — LIDOCAINE HYDROCHLORIDE 10 MG/ML
2 INJECTION, SOLUTION EPIDURAL; INFILTRATION; INTRACAUDAL; PERINEURAL
Status: COMPLETED | OUTPATIENT
Start: 2023-01-06 | End: 2023-01-06

## 2023-01-06 RX ORDER — METHYLPREDNISOLONE ACETATE 80 MG/ML
80 INJECTION, SUSPENSION INTRA-ARTICULAR; INTRALESIONAL; INTRAMUSCULAR; SOFT TISSUE
Status: COMPLETED | OUTPATIENT
Start: 2023-01-06 | End: 2023-01-06

## 2023-01-06 RX ADMIN — METHYLPREDNISOLONE ACETATE 80 MG: 80 INJECTION, SUSPENSION INTRA-ARTICULAR; INTRALESIONAL; INTRAMUSCULAR; SOFT TISSUE at 13:50

## 2023-01-06 RX ADMIN — METHYLPREDNISOLONE ACETATE 80 MG: 80 INJECTION, SUSPENSION INTRA-ARTICULAR; INTRALESIONAL; INTRAMUSCULAR; SOFT TISSUE at 13:49

## 2023-01-06 RX ADMIN — LIDOCAINE HYDROCHLORIDE 2 ML: 10 INJECTION, SOLUTION EPIDURAL; INFILTRATION; INTRACAUDAL; PERINEURAL at 13:49

## 2023-01-06 RX ADMIN — LIDOCAINE HYDROCHLORIDE 2 ML: 10 INJECTION, SOLUTION EPIDURAL; INFILTRATION; INTRACAUDAL; PERINEURAL at 13:50

## 2023-02-09 NOTE — PROGRESS NOTES
[facility] [place] [facility]              Follow Up      Patient Name: Ellyn Sanchez  : 1960  Primary Care Physician: Irene Wilson APRN        Chief Complaint: Right knee pain    HPI:   Ellyn Sanchez is a 62 y.o. year old who presents today for evaluation of right knee pain.  Patient reports a history of right knee pain for over 3 months now, I last saw her about a month ago where she reported those complaints initially.  We obtained some x-rays and I had some concerns that this was meniscal pathology but her symptoms were more intermittent.  Since I last saw her she says that her knee pain has become more of a constant source of discomfort, worse with standing walking, twisting motions and she has nighttime symptoms now.  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  She feels like her symptoms really isolated to her knee.  At the last visit I gave her a home exercise program for stretching and strengthening, she takes Aleve regularly.  She gets some relief when she is off of work for a few days.  She got a massage that helped but it was temporary.  She has tried bracing, she takes tramadol as well at night times.  All without sufficient relief and her symptoms are worsening.    Past Medical/Surgical, Social and Family History:  I have reviewed and/or updated pertinent history as noted in the medical record including:  Past Medical History:   Diagnosis Date   • Disease of thyroid gland    • Kidney stone      Past Surgical History:   Procedure Laterality Date   • APPENDECTOMY     • KIDNEY STONE SURGERY  2017     Social History     Occupational History   • Not on file   Tobacco Use   • Smoking status: Former   • Smokeless tobacco: Never   Vaping Use   • Vaping Use:  Never used   Substance and Sexual Activity   • Alcohol use: Yes     Comment: Socially   • Drug use: Defer   • Sexual activity: Defer          Allergies:   Allergies   Allergen Reactions   • Celecoxib GI Intolerance       Medications:   Home Medications:  Current Outpatient Medications on File Prior to Visit   Medication Sig   • azelastine (ASTEPRO) 0.15 % solution nasal spray 2 sprays 2 (Two) Times a Day.   • fluticasone (FLONASE) 50 MCG/ACT nasal spray 1 spray into each nostril daily as needed. Spray in each nostril.   • gabapentin (NEURONTIN) 600 MG tablet Take 600 mg by mouth every night at bedtime.   • levothyroxine (SYNTHROID, LEVOTHROID) 75 MCG tablet TAKE 1 TABLET BY MOUTH ONE TIME A DAY    • naproxen sodium (ALEVE) 220 MG tablet Take 220 mg by mouth 2 (Two) Times a Day As Needed.   • traMADol (ULTRAM) 50 MG tablet Take 50 mg by mouth Daily.   • valACYclovir (VALTREX) 500 MG tablet Take  by mouth. Take as directed for cold sores and genital herpes.   • [DISCONTINUED] gabapentin (NEURONTIN) 300 MG capsule Take 300 mg by mouth every night at bedtime.   • [DISCONTINUED] methocarbamol (ROBAXIN) 750 MG tablet Take 2 tablets three or four times daily as needed for back pain.   • [DISCONTINUED] naproxen (EC NAPROSYN) 500 MG EC tablet May take 1 tablet twice daily with food as needed for pain.     No current facility-administered medications on file prior to visit.         ROS:  ROS negative except as listed in the HPI.    Physical Exam:   62 y.o. female  Body mass index is 28.19 kg/m²., 69.9 kg (154 lb 1.6 oz)  Vitals:    02/10/23 0949   Temp: 98.2 °F (36.8 °C)     General: Alert, cooperative, appears well and in no observable distress. Appears stated age and BMI as listed above.  HEENT: Normocephalic, atraumatic on external visual inspection.  CV: No significant peripheral edema.  Respiratory: Normal respiratory effort.  Skin: Warm & well perfused; appropriate skin turgor.  Psych: Appropriate mood & affect.  Neuro:  Gross sensation and motor intact in affected extremity/extremities.  Vascular: Peripheral pulses palpable in affected extremity/extremities.     MSK Exam:    Knee Exam:  Right   No deformity or wounds appreciated. No significant redness or warmth.  Trace effusion noted.  Tenderness along the joint line appreciated laterally.  ROM 0-130 with pain at terminal motion.  Ligamentous exam grossly stable.  Le + lateral  Bounce Home +  Quad strength 4-4+/5    Left   No deformity or wounds appreciated. No significant redness or warmth.  No significant effusion noted.  No significant tenderness appreciated about the joint.  ROM 0-130 and painless.  Ligamentous exam grossly stable.  Quad strength 4+ to 5/5.    Brief hip exam in the affected extremity(ies) grossly unremarkable.  Moves ankle and toes up and down, no significant pain or swelling in the foot, ankle or calf.        Radiology:    No new images were needed at the visit today. Previous images were obtained 1 month ago and showed some moderate medial compartment narrowing, no obvious degenerative changes in the lateral compartments of her knees.  She had some mild patellofemoral changes otherwise no acute bony pathology to account for her reported symptoms.    Procedure:   N/A    Procedures    Misc. Data/Labs: N/A    Assessment & Plan:    ICD-10-CM ICD-9-CM   1. Tear of lateral meniscus of right knee, current, unspecified tear type, subsequent encounter  S83.281D V58.89     836.1   2. Chronic pain of right knee  M25.561 719.46    G89.29 338.29     No orders of the defined types were placed in this encounter.    Orders Placed This Encounter   Procedures   • MRI Knee Right Without Contrast         This is a 62-year-old female with chronic right knee pain and mechanical symptoms and a positive lateral Le.  Her symptoms are concerning for lateral meniscus tear.  She has tried the usual conservative treatments for well over 6 weeks including stretching and  strengthening program, anti-inflammatories, bracing and rest without improvement in her symptoms and in fact her symptoms are worsening.  Plan is to proceed with an MRI to better evaluate and treat her condition.  I will review the results and if there is meniscus tear I may have her see Dr. Wan in follow-up.  I will discuss the findings with her once we get the results and go from there.  I recommended that she continue with her Aleve, we may need to have her take some time off work if her symptoms continue to worsen.  We talked about using topical Voltaren gel as well to try and better help control her symptoms.    Return for follow up after the MRI.    Patient encouraged to call with questions or concerns prior to follow up.  Recommend ICE and/or HEAT PRN as discussed.  Will discuss with attending physician as needed.  Consider additional referrals, work up and/or advanced imaging as indicated or if patient fails to respond to conservative care.        VAMSHI Mendoza      Dictated Utilizing Dragon Dictation

## 2023-02-10 ENCOUNTER — OFFICE VISIT (OUTPATIENT)
Dept: ORTHOPEDIC SURGERY | Facility: CLINIC | Age: 63
End: 2023-02-10
Payer: COMMERCIAL

## 2023-02-10 VITALS — BODY MASS INDEX: 28.36 KG/M2 | TEMPERATURE: 98.2 F | HEIGHT: 62 IN | WEIGHT: 154.1 LBS

## 2023-02-10 DIAGNOSIS — G89.29 CHRONIC PAIN OF RIGHT KNEE: ICD-10-CM

## 2023-02-10 DIAGNOSIS — S83.281D TEAR OF LATERAL MENISCUS OF RIGHT KNEE, CURRENT, UNSPECIFIED TEAR TYPE, SUBSEQUENT ENCOUNTER: Primary | ICD-10-CM

## 2023-02-10 DIAGNOSIS — M25.561 CHRONIC PAIN OF RIGHT KNEE: ICD-10-CM

## 2023-02-10 PROCEDURE — 99213 OFFICE O/P EST LOW 20 MIN: CPT | Performed by: NURSE PRACTITIONER

## 2023-02-10 RX ORDER — NAPROXEN SODIUM 220 MG
220 TABLET ORAL 2 TIMES DAILY PRN
COMMUNITY

## 2023-02-16 ENCOUNTER — TELEPHONE (OUTPATIENT)
Dept: ORTHOPEDIC SURGERY | Facility: CLINIC | Age: 63
End: 2023-02-16
Payer: COMMERCIAL

## 2023-02-22 NOTE — PROGRESS NOTES
[facility] [place]  Follow Up Visit      Patient Name: Ellyn Sanchez  : 1960  Primary Care Physician: Irene Wilson APRN        Chief Complaint: Right knee pain    HPI:   Ellyn Sanchez is a 62 y.o. year old who presents today for right knee pain.  Patient is coming in today for an MRI follow-up regarding her right knee.  MRI was obtained on February 15 demonstrated some degenerative changes and a complex medial meniscus tear.  She is here today to go over those results and discuss additional treatment.  She reports her symptoms are the same.  She had to take 2 of her pain pills yesterday.  She has less pain when not at work but when up and walking twisting motions etc. she has a lot of discomfort that really is somewhat global in the knee now.    ROS:  ROS negative except as listed in the HPI.    Allergies:   Allergies   Allergen Reactions   • Celecoxib GI Intolerance       Medications:  Current Outpatient Medications on File Prior to Visit   Medication Sig   • azelastine (ASTEPRO) 0.15 % solution nasal spray 2 sprays 2 (Two) Times a Day.   • fluticasone (FLONASE) 50 MCG/ACT nasal spray 1 spray into each nostril daily as needed. Spray in each nostril.   • gabapentin (NEURONTIN) 600 MG tablet Take 600 mg by mouth every night at bedtime.   • levothyroxine (SYNTHROID, LEVOTHROID) 75 MCG tablet TAKE 1 TABLET BY MOUTH ONE TIME A DAY    • naproxen sodium (ALEVE) 220 MG tablet Take 220 mg by mouth 2 (Two) Times a Day As Needed.   • traMADol (ULTRAM) 50 MG tablet Take 50 mg by mouth Daily.   • valACYclovir (VALTREX) 500 MG tablet Take  by mouth. Take as directed for cold sores and genital herpes.     No current facility-administered medications on file prior to visit.       Physical Exam:   62 y.o. female  Body mass index is 27.07 kg/m²., 69.3 kg (152 lb 12.8 oz)  Vitals:    23 0923   Temp: 96.9 °F (36.1 °C)     General: Alert, cooperative, appears well and in no observable distress. Appears  stated age and BMI as listed above.  Respiratory: Normal respiratory effort.  Skin: Warm & well perfused; appropriate skin turgor.  Psych: Appropriate mood & affect.    MSK Exam:  Right   No deformity or wounds appreciated. No significant redness or warmth.  Trace effusion noted.  Tenderness along the joint line appreciated medial and patellofemoral.  ROM 0-130 with pain at terminal motion.  Ligamentous exam grossly stable.  Le + medial  Bounce Home +  Quad strength 4-4+/5     Left   No deformity or wounds appreciated. No significant redness or warmth.  No significant effusion noted.  No significant tenderness appreciated about the joint.  ROM 0-130 and painless.  Ligamentous exam grossly stable.  Quad strength 4+ to 5/5.     Brief hip exam in the affected extremity(ies) grossly unremarkable.  Moves ankle and toes up and down, no significant pain or swelling in the foot, ankle or calf.      Radiology:    No new images were needed at the visit today.      We reviewed her MRI that was performed at [facility] as described above on February 15, 2023.  I was able to review the imaging directly with her in the room as well and I agree with the findings noted by the radiologist which include tricompartmental chondromalacia most significantly at the lateral patellofemoral joint space with some mild subluxation.  She has some moderate to high-grade chondromalacia at the posterior nonweightbearing medial femoral condyle.  Low-grade lateral compartment changes.    There is a complex posterior body and horn medial meniscus tear.  Lateral meniscus shows myxoid degeneration without a tear.  She has 2 loose bodies 1 in the suprapatellar recess and the other in the lateral patellofemoral gutter    Procedure:   See Procedure Note: The potential risks and benefits of performing a diagnostic and therapeutic injection were discussed with the patient prior to procedure. Risks include, but are not limited to infection,  swelling, transient increase in pain, bleeding, bruising. Patient was advised that injections are a diagnostic and therapeutic tool meaning they may not alleviate symptoms at all, or may only provide partial or temporary relief. Injection precautions and aftercare discussed.    [facility]. Data/Labs: N/A    Assessment & Plan:    ICD-10-CM ICD-9-CM   1. Complex tear of medial meniscus of right knee as current injury, subsequent encounter  S83.231D V58.89   2. Primary osteoarthritis of right knee  M17.11 715.16   3. Chronic pain of right knee  M25.561 719.46    G89.29 338.29     No orders of the defined types were placed in this encounter.    Orders Placed This Encounter   Procedures   • Large Joint Arthrocentesis: R knee   • Ambulatory Referral to Physical Therapy Evaluate and treat         This is a 62-year-old female with chronic right knee pain.  I suspect the majority of her symptoms are related to her medial meniscus tear and likely some pain related to lateralization of her patella.  I did briefly review her MRI with Dr. Wan and I think at least 1 injection with some physical therapy would be a good option for her prior to considering any arthroscopic intervention.  She does have some higher grade changes in the medial compartment and may not be a good candidate for arthroscopy ultimately.  She has a little bit of locking and catching that has improved some but still having the pain.  I will see her back in about 6 weeks for her shoulders we will talk about her knee at that time as well.  If her symptoms do not respond to the injection and PT asked her to call me sooner and let me know and I will have her sit down with Dr. Wan and discuss any other options up to and including surgery.    Return in about 6 weeks (around 4/7/2023).    Patient encouraged to call with questions or concerns prior to follow up.  Recommend ICE and/or HEAT PRN as discussed.  Will discuss with attending physician as needed.  Consider  additional referrals, work up and/or advanced imaging as indicated or if patient fails to respond to conservative care.      Large Joint Arthrocentesis: R knee  Date/Time: 2/24/2023 9:25 AM  Consent given by: patient  Site marked: site marked  Timeout: Immediately prior to procedure a time out was called to verify the correct patient, procedure, equipment, support staff and site/side marked as required   Supporting Documentation  Indications: pain, joint swelling and diagnostic evaluation   Procedure Details  Location: knee - R knee  Preparation: Patient was prepped and draped in the usual sterile fashion  Needle gauge: 21G.  Medications administered: 80 mg methylPREDNISolone acetate 80 MG/ML; 2 mL lidocaine PF 1% 1 %  Patient tolerance: patient tolerated the procedure well with no immediate complications          Jose Weeks, APRN

## 2023-02-24 ENCOUNTER — CLINICAL SUPPORT (OUTPATIENT)
Dept: ORTHOPEDIC SURGERY | Facility: CLINIC | Age: 63
End: 2023-02-24
Payer: COMMERCIAL

## 2023-02-24 VITALS — WEIGHT: 152.8 LBS | BODY MASS INDEX: 27.07 KG/M2 | TEMPERATURE: 96.9 F | HEIGHT: 63 IN

## 2023-02-24 DIAGNOSIS — M25.561 CHRONIC PAIN OF RIGHT KNEE: ICD-10-CM

## 2023-02-24 DIAGNOSIS — S83.231D COMPLEX TEAR OF MEDIAL MENISCUS OF RIGHT KNEE AS CURRENT INJURY, SUBSEQUENT ENCOUNTER: Primary | ICD-10-CM

## 2023-02-24 DIAGNOSIS — M17.11 PRIMARY OSTEOARTHRITIS OF RIGHT KNEE: ICD-10-CM

## 2023-02-24 DIAGNOSIS — G89.29 CHRONIC PAIN OF RIGHT KNEE: ICD-10-CM

## 2023-02-24 PROCEDURE — 20610 DRAIN/INJ JOINT/BURSA W/O US: CPT | Performed by: NURSE PRACTITIONER

## 2023-02-24 PROCEDURE — 99213 OFFICE O/P EST LOW 20 MIN: CPT | Performed by: NURSE PRACTITIONER

## 2023-02-24 RX ORDER — METHYLPREDNISOLONE ACETATE 80 MG/ML
80 INJECTION, SUSPENSION INTRA-ARTICULAR; INTRALESIONAL; INTRAMUSCULAR; SOFT TISSUE
Status: COMPLETED | OUTPATIENT
Start: 2023-02-24 | End: 2023-02-24

## 2023-02-24 RX ORDER — LIDOCAINE HYDROCHLORIDE 10 MG/ML
2 INJECTION, SOLUTION EPIDURAL; INFILTRATION; INTRACAUDAL; PERINEURAL
Status: COMPLETED | OUTPATIENT
Start: 2023-02-24 | End: 2023-02-24

## 2023-02-24 RX ADMIN — METHYLPREDNISOLONE ACETATE 80 MG: 80 INJECTION, SUSPENSION INTRA-ARTICULAR; INTRALESIONAL; INTRAMUSCULAR; SOFT TISSUE at 09:25

## 2023-02-24 RX ADMIN — LIDOCAINE HYDROCHLORIDE 2 ML: 10 INJECTION, SOLUTION EPIDURAL; INFILTRATION; INTRACAUDAL; PERINEURAL at 09:25

## 2023-03-06 DIAGNOSIS — S83.281D TEAR OF LATERAL MENISCUS OF RIGHT KNEE, CURRENT, UNSPECIFIED TEAR TYPE, SUBSEQUENT ENCOUNTER: ICD-10-CM

## 2023-03-06 DIAGNOSIS — G89.29 CHRONIC PAIN OF RIGHT KNEE: ICD-10-CM

## 2023-03-06 DIAGNOSIS — M25.561 CHRONIC PAIN OF RIGHT KNEE: ICD-10-CM

## 2023-04-06 NOTE — PROGRESS NOTES
[facility]  Follow Up Visit      Patient Name: Ellyn Sanchez  : 1960  Primary Care Physician: Irene Wilson APRN        Chief Complaint: Bilateral shoulder pain    HPI:   Ellyn Sanchez is a 62 y.o. year old who presents today for bilateral shoulder pain.  Patient has a history of bilateral shoulder pain and known degenerative changes.  She gets intermittent injections with good relief in her symptoms.  She works full-time at [employer] and is nearing long term but wishes to continue with conservative care until then.  She understands surgical options would likely include shoulder replacement at some point.  The injections are doing well to help control her symptoms.     We also are following up about her right knee.  Last visit was  she had an MRI that demonstrated a complex medial meniscus tear as well as some degenerative changes.  We elected to try an injection as a diagnostic tool she says that this week the knee is 50-60 percent better this week but only initially had 7-10 days of relief with the injection.  She has now had 5 visits with physical therapy as of today and does feel like she is starting to make some improvements.    ROS:  ROS negative except as listed in the HPI.    Allergies:   Allergies   Allergen Reactions   • Celecoxib GI Intolerance       Medications:  Current Outpatient Medications on File Prior to Visit   Medication Sig   • azelastine (ASTEPRO) 0.15 % solution nasal spray 2 sprays 2 (Two) Times a Day.   • fluticasone (FLONASE) 50 MCG/ACT nasal spray 1 spray into the nostril(s) as directed by provider Daily As Needed. Spray in each nostril.   • gabapentin (NEURONTIN) 600 MG tablet Take 1 tablet by mouth every night at bedtime.   • levothyroxine (SYNTHROID, LEVOTHROID) 75 MCG tablet TAKE 1 TABLET BY MOUTH ONE TIME A DAY    • naproxen sodium (ALEVE) 220 MG tablet Take 1 tablet by mouth 2 (Two) Times a Day As Needed.   • traMADol (ULTRAM) 50 MG tablet Take 1  tablet by mouth Daily.   • valACYclovir (VALTREX) 500 MG tablet Take  by mouth. Take as directed for cold sores and genital herpes.     No current facility-administered medications on file prior to visit.       Physical Exam:   62 y.o. female  Body mass index is 26.61 kg/m²., 66 kg (145 lb 8 oz)  Vitals:    04/07/23 1008   Temp: 97.3 °F (36.3 °C)     General: Alert, cooperative, appears well and in no observable distress. Appears stated age and BMI as listed above.  Respiratory: Normal respiratory effort.  Skin: Warm & well perfused; appropriate skin turgor.  Psych: Appropriate mood & affect.    MSK Exam:  Exam is unchanged     Radiology:    No new images were needed at the visit today.     Procedure:   See Procedure Note: The potential risks and benefits of performing a diagnostic and therapeutic injection were discussed with the patient prior to procedure. Risks include, but are not limited to infection, swelling, transient increase in pain, bleeding, bruising. Patient was advised that injections are a diagnostic and therapeutic tool meaning they may not alleviate symptoms at all, or may only provide partial or temporary relief. Injection precautions and aftercare discussed.    [facility]. Data/Labs: N/A    Assessment & Plan:    ICD-10-CM ICD-9-CM   1. Arthritis of both glenohumeral joints  M19.011 716.91    M19.012    2. Bilateral shoulder pain, unspecified chronicity  M25.511 719.41    M25.512    3. Complex tear of medial meniscus of right knee as current injury, subsequent encounter  S83.231D V58.89   4. Primary osteoarthritis of right knee  M17.11 715.16   5. Chronic pain of right knee  M25.561 719.46    G89.29 338.29     No orders of the defined types were placed in this encounter.    Orders Placed This Encounter   Procedures   • Large Joint Arthrocentesis: L glenohumeral   • Large Joint Arthrocentesis: R glenohumeral   • Ambulatory Referral to Physical Therapy         This is a 62-year-old female with chronic  bilateral shoulder pain and glenohumeral arthritis.  Plan is to proceed with injections again today which she tolerated well I will see her back in 3 months for her shoulders.    Additionally she has some right knee pain and we discovered a complex medial meniscus tear as well as degenerative changes.  Initially she felt like her injection was not going to improve her symptoms but she is starting to feel little bit better this week and is done about 5 visits of PT.  I am going to give her a new order to continue physical therapy for her knee.  We talked about follow-up on the knee if her pain plateaus worsens or fails to continue improving I would have her see Dr. Wan for consideration of arthroscopy.    Return in about 3 months (around 7/7/2023) for Injection with VAMSHI Wallace.    Patient encouraged to call with questions or concerns prior to follow up.  Recommend ICE and/or HEAT PRN as discussed.  Will discuss with attending physician as needed.  Consider additional referrals, work up and/or advanced imaging as indicated or if patient fails to respond to conservative care.      Large Joint Arthrocentesis: L glenohumeral  Date/Time: 4/7/2023 10:10 AM  Consent given by: patient  Site marked: site marked  Timeout: Immediately prior to procedure a time out was called to verify the correct patient, procedure, equipment, support staff and site/side marked as required   Supporting Documentation  Indications: pain   Procedure Details  Location: shoulder - L glenohumeral  Preparation: Patient was prepped and draped in the usual sterile fashion  Needle gauge: 21G.  Approach: posterior  Medications administered: 80 mg methylPREDNISolone acetate 80 MG/ML; 2 mL lidocaine PF 1% 1 %  Patient tolerance: patient tolerated the procedure well with no immediate complications    Large Joint Arthrocentesis: R glenohumeral  Date/Time: 4/7/2023 10:10 AM  Consent given by: patient  Site marked: site marked  Timeout: Immediately  prior to procedure a time out was called to verify the correct patient, procedure, equipment, support staff and site/side marked as required   Supporting Documentation  Indications: pain   Procedure Details  Location: shoulder - R glenohumeral  Preparation: Patient was prepped and draped in the usual sterile fashion  Needle gauge: 21G.  Approach: posterior  Medications administered: 80 mg methylPREDNISolone acetate 80 MG/ML; 2 mL lidocaine PF 1% 1 %  Patient tolerance: patient tolerated the procedure well with no immediate complications          Jose Weeks, APRN

## 2023-04-07 ENCOUNTER — CLINICAL SUPPORT (OUTPATIENT)
Dept: ORTHOPEDIC SURGERY | Facility: CLINIC | Age: 63
End: 2023-04-07
Payer: COMMERCIAL

## 2023-04-07 VITALS — HEIGHT: 62 IN | TEMPERATURE: 97.3 F | WEIGHT: 145.5 LBS | BODY MASS INDEX: 26.78 KG/M2

## 2023-04-07 DIAGNOSIS — M17.11 PRIMARY OSTEOARTHRITIS OF RIGHT KNEE: ICD-10-CM

## 2023-04-07 DIAGNOSIS — M25.561 CHRONIC PAIN OF RIGHT KNEE: ICD-10-CM

## 2023-04-07 DIAGNOSIS — M19.012 ARTHRITIS OF BOTH GLENOHUMERAL JOINTS: Primary | ICD-10-CM

## 2023-04-07 DIAGNOSIS — G89.29 CHRONIC PAIN OF RIGHT KNEE: ICD-10-CM

## 2023-04-07 DIAGNOSIS — S83.231D COMPLEX TEAR OF MEDIAL MENISCUS OF RIGHT KNEE AS CURRENT INJURY, SUBSEQUENT ENCOUNTER: ICD-10-CM

## 2023-04-07 DIAGNOSIS — M25.512 BILATERAL SHOULDER PAIN, UNSPECIFIED CHRONICITY: ICD-10-CM

## 2023-04-07 DIAGNOSIS — M19.011 ARTHRITIS OF BOTH GLENOHUMERAL JOINTS: Primary | ICD-10-CM

## 2023-04-07 DIAGNOSIS — M25.511 BILATERAL SHOULDER PAIN, UNSPECIFIED CHRONICITY: ICD-10-CM

## 2023-04-07 PROCEDURE — 20610 DRAIN/INJ JOINT/BURSA W/O US: CPT | Performed by: NURSE PRACTITIONER

## 2023-04-07 PROCEDURE — 99213 OFFICE O/P EST LOW 20 MIN: CPT | Performed by: NURSE PRACTITIONER

## 2023-04-07 RX ADMIN — LIDOCAINE HYDROCHLORIDE 2 ML: 10 INJECTION, SOLUTION EPIDURAL; INFILTRATION; INTRACAUDAL; PERINEURAL at 10:10

## 2023-04-07 RX ADMIN — METHYLPREDNISOLONE ACETATE 80 MG: 80 INJECTION, SUSPENSION INTRA-ARTICULAR; INTRALESIONAL; INTRAMUSCULAR; SOFT TISSUE at 10:10

## 2023-04-10 RX ORDER — LIDOCAINE HYDROCHLORIDE 10 MG/ML
2 INJECTION, SOLUTION EPIDURAL; INFILTRATION; INTRACAUDAL; PERINEURAL
Status: COMPLETED | OUTPATIENT
Start: 2023-04-07 | End: 2023-04-07

## 2023-04-10 RX ORDER — METHYLPREDNISOLONE ACETATE 80 MG/ML
80 INJECTION, SUSPENSION INTRA-ARTICULAR; INTRALESIONAL; INTRAMUSCULAR; SOFT TISSUE
Status: COMPLETED | OUTPATIENT
Start: 2023-04-07 | End: 2023-04-07

## 2023-08-02 ENCOUNTER — OFFICE VISIT (OUTPATIENT)
Dept: ORTHOPEDIC SURGERY | Facility: CLINIC | Age: 63
End: 2023-08-02
Payer: COMMERCIAL

## 2023-08-02 VITALS — BODY MASS INDEX: 27.2 KG/M2 | TEMPERATURE: 97.3 F | WEIGHT: 147.8 LBS | HEIGHT: 62 IN

## 2023-08-02 DIAGNOSIS — M19.012 PRIMARY OSTEOARTHRITIS OF LEFT SHOULDER: Primary | ICD-10-CM

## 2023-08-02 PROBLEM — M19.019 SHOULDER ARTHRITIS: Status: ACTIVE | Noted: 2023-08-02

## 2023-08-02 PROCEDURE — 99214 OFFICE O/P EST MOD 30 MIN: CPT | Performed by: ORTHOPAEDIC SURGERY

## 2023-08-02 RX ORDER — MELOXICAM 15 MG/1
15 TABLET ORAL ONCE
OUTPATIENT
Start: 2023-08-31 | End: 2023-08-02

## 2023-08-02 RX ORDER — VANCOMYCIN HYDROCHLORIDE 1 G/200ML
15 INJECTION, SOLUTION INTRAVENOUS ONCE
OUTPATIENT
Start: 2023-08-31 | End: 2023-08-02

## 2023-08-02 RX ORDER — LEVOTHYROXINE SODIUM 0.05 MG/1
TABLET ORAL
COMMUNITY
Start: 2022-10-24

## 2023-08-02 RX ORDER — CEFAZOLIN SODIUM 2 G/100ML
2 INJECTION, SOLUTION INTRAVENOUS ONCE
OUTPATIENT
Start: 2023-08-31 | End: 2023-08-02

## 2023-08-02 RX ORDER — ACETAMINOPHEN 325 MG/1
1000 TABLET ORAL ONCE
OUTPATIENT
Start: 2023-08-31 | End: 2023-08-02

## 2023-08-02 RX ORDER — PREGABALIN 75 MG/1
150 CAPSULE ORAL ONCE
OUTPATIENT
Start: 2023-08-31 | End: 2023-08-02

## 2023-08-03 PROBLEM — M19.012 PRIMARY OSTEOARTHRITIS OF LEFT SHOULDER: Status: ACTIVE | Noted: 2023-08-03

## 2023-08-16 ENCOUNTER — TELEPHONE (OUTPATIENT)
Dept: ORTHOPEDIC SURGERY | Facility: HOSPITAL | Age: 63
End: 2023-08-16
Payer: COMMERCIAL

## 2023-08-16 NOTE — TELEPHONE ENCOUNTER
Risk Factor yes no   Age >75  X   BMI <20 >40  X   Patient History     Chronic Pain (2 or more meds/Pain Management) X    ETOH (more than 3 drinks Daily)  X   Uncontrolled Depression/Bipolar/Schizoaffective Disorder  X   Arrhythmias--  X   Stent placement/MI  X   DVT/PE  X   Pacemaker  X   HTN (uncontrolled or requiring more than 2 medications)  X   CHF/Retained fluids/Edema  X   Stroke with Residual   X   COPD/Asthma  X   ECHO--Non-compliant with CPAP  X   Diabetes (on insulin or more than 2 meds)         A1C:  X   BPH/Urinary retention (on medication)  X   CKD  X   Home environment and support     Current ambulation status (use of cane, walker, W/C, Multiple falls/weakness)  X   Stairs to enter and throughout home  X   Lives Alone X    Doesn't have support at home  X   Outpatient Screening Assessment    Home needs: (Walker/BSC):  Total Shoulder  ? Steps No steps   Caregiver 24-48hrs post-discharge: Yes     Discharge Plan:   Total Shoulder     Prescriptions: Meds to bed    Home medications:   [] Blood thinner/anti-coag therapy--    [] BPH or diuretic--  [] BP meds--   ? Pain/Anti-inflammatories-- Gabapentin, Ultram   Pre-op Education:  Educate patient on spinal anesthesia/pain control:  ? patient verbalize understanding    Educate patient on hospital course/timeline:  ?  patient verbalize understanding    Joint Care Class:  ?  yes [] no  Notes:   [facility] 8/16

## 2023-08-17 ENCOUNTER — PRE-ADMISSION TESTING (OUTPATIENT)
Dept: PREADMISSION TESTING | Facility: HOSPITAL | Age: 63
End: 2023-08-17
Payer: COMMERCIAL

## 2023-08-17 VITALS
WEIGHT: 142 LBS | SYSTOLIC BLOOD PRESSURE: 114 MMHG | OXYGEN SATURATION: 98 % | HEIGHT: 62 IN | BODY MASS INDEX: 26.13 KG/M2 | RESPIRATION RATE: 20 BRPM | HEART RATE: 77 BPM | DIASTOLIC BLOOD PRESSURE: 70 MMHG | TEMPERATURE: 97.7 F

## 2023-08-17 LAB
ANION GAP SERPL CALCULATED.3IONS-SCNC: 11.8 MMOL/L (ref 5–15)
BUN SERPL-MCNC: 13 MG/DL (ref 8–23)
BUN/CREAT SERPL: 22.8 (ref 7–25)
CALCIUM SPEC-SCNC: 9.4 MG/DL (ref 8.6–10.5)
CHLORIDE SERPL-SCNC: 106 MMOL/L (ref 98–107)
CO2 SERPL-SCNC: 24.2 MMOL/L (ref 22–29)
CREAT SERPL-MCNC: 0.57 MG/DL (ref 0.57–1)
DEPRECATED RDW RBC AUTO: 39.5 FL (ref 37–54)
EGFRCR SERPLBLD CKD-EPI 2021: 101.6 ML/MIN/1.73
ERYTHROCYTE [DISTWIDTH] IN BLOOD BY AUTOMATED COUNT: 12.2 % (ref 12.3–15.4)
GLUCOSE SERPL-MCNC: 89 MG/DL (ref 65–99)
HCT VFR BLD AUTO: 41.2 % (ref 34–46.6)
HGB BLD-MCNC: 13.6 G/DL (ref 12–15.9)
MCH RBC QN AUTO: 28.9 PG (ref 26.6–33)
MCHC RBC AUTO-ENTMCNC: 33 G/DL (ref 31.5–35.7)
MCV RBC AUTO: 87.7 FL (ref 79–97)
PLATELET # BLD AUTO: 217 10*3/MM3 (ref 140–450)
PMV BLD AUTO: 10.7 FL (ref 6–12)
POTASSIUM SERPL-SCNC: 4.9 MMOL/L (ref 3.5–5.2)
RBC # BLD AUTO: 4.7 10*6/MM3 (ref 3.77–5.28)
SODIUM SERPL-SCNC: 142 MMOL/L (ref 136–145)
WBC NRBC COR # BLD: 7.44 10*3/MM3 (ref 3.4–10.8)

## 2023-08-17 PROCEDURE — 36415 COLL VENOUS BLD VENIPUNCTURE: CPT

## 2023-08-17 PROCEDURE — 85027 COMPLETE CBC AUTOMATED: CPT

## 2023-08-17 PROCEDURE — 93005 ELECTROCARDIOGRAM TRACING: CPT

## 2023-08-17 PROCEDURE — 80048 BASIC METABOLIC PNL TOTAL CA: CPT

## 2023-08-17 RX ORDER — CHLORHEXIDINE GLUCONATE 500 MG/1
1 CLOTH TOPICAL
COMMUNITY

## 2023-08-17 RX ORDER — LEVOTHYROXINE SODIUM 0.05 MG/1
75 TABLET ORAL
COMMUNITY

## 2023-08-17 NOTE — DISCHARGE INSTRUCTIONS
Take the following medications the morning of surgery:     Valtrex   synthroid   astepro nasal spray    If you are on prescription narcotic pain medication to control your pain you may also take that medication the morning of surgery.    General Instructions:  Do not eat solid food after midnight the night before surgery.  You may drink clear liquids day of surgery but must stop at least one hour before your hospital arrival time.  It is beneficial for you to have a clear drink that contains carbohydrates the day of surgery.  We suggest a 12 to 20 ounce bottle of Gatorade or Powerade for non-diabetic patients or a 12 to 20 ounce bottle of G2 or Powerade Zero for diabetic patients. (Pediatric patients, are not advised to drink a 12 to 20 ounce carbohydrate drink)    Clear liquids are liquids you can see through.  Nothing red in color.     Plain water                               Sports drinks  Sodas                                   Gelatin (Jell-O)  Fruit juices without pulp such as white grape juice and apple juice  Popsicles that contain no fruit or yogurt  Tea or coffee (no cream or milk added)  Gatorade / Powerade  G2 / Powerade Zero    Infants may have breast milk up to four hours before surgery.  Infants drinking formula may drink formula up to six hours before surgery.   Patients who avoid smoking, chewing tobacco and alcohol for 4 weeks prior to surgery have a reduced risk of post-operative complications.  Quit smoking as many days before surgery as you can.  Do not smoke, use chewing tobacco or drink alcohol the day of surgery.   If applicable bring your C-PAP/ BI-PAP machine in with you to preop day of surgery.  Bring any papers given to you in the doctor's office.  Wear clean comfortable clothes.  Do not wear contact lenses, false eyelashes or make-up.  Bring a case for your glasses.   Bring crutches or walker if applicable.  Remove all piercings.  Leave jewelry and any other valuables at home.  Hair  extensions with metal clips must be removed prior to surgery.  The Pre-Admission Testing nurse will instruct you to bring medications if unable to obtain an accurate list in Pre-Admission Testing.        If you were given a blood bank ID arm band remember to bring it with you the day of surgery.    Day of surgery:8/31/2023   Hospital to call with time of arrival  Your arrival time is approximately two hours before your scheduled surgery time.  Upon arrival, a Pre-op nurse and Anesthesiologist will review your health history, obtain vital signs, and answer questions you may have.  The only belongings needed at this time will be a list of your home medications and if applicable your C-PAP/BI-PAP machine.  A Pre-op nurse will start an IV and you may receive medication in preparation for surgery, including something to help you relax.     Please be aware that surgery does come with discomfort.  We want to make every effort to control your discomfort so please discuss any uncontrolled symptoms with your nurse.   Your doctor will most likely have prescribed pain medications.      If you are going home after surgery you will receive individualized written care instructions before being discharged.  A responsible adult must drive you to and from the hospital on the day of your surgery and stay with you for 24 hours.  Discharge prescriptions can be filled by the hospital pharmacy during regular pharmacy hours.  If you are having surgery late in the day/evening your prescription may be e-prescribed to your pharmacy.  Please verify your pharmacy hours or chose a 24 hour pharmacy to avoid not having access to your prescription because your pharmacy has closed for the day.    If you are staying overnight following surgery, you will be transported to your hospital room following the recovery period.   has all private rooms.    If you have any questions please call Pre-Admission Testing at  [phone].  Deductibles and co-payments are collected on the day of service. Please be prepared to pay the required co-pay, deductible or deposit on the day of service as defined by your plan.    Call your surgeon immediately if you experience any of the following symptoms:  Sore Throat  Shortness of Breath or difficulty breathing  Cough  Chills  Body soreness or muscle pain  Headache  Fever  New loss of taste or smell  Do not arrive for your surgery ill.  Your procedure will need to be rescheduled to another time.  You will need to call your physician before the day of surgery to avoid any unnecessary exposure to hospital staff as well as other patients.        PREVENTING INFECTION IN SHOULDER SURGICAL SITES     C. acnes is a bacteria that lives deep within follicles and pores of the skin. It is found in large numbers on the skin of the face, axilla (armpit), chest and back and is the primary bacteria to cause a surgical site infection after shoulder surgery.      Use of a Benzoyl Peroxide solution prior to shoulder surgery decreases C. acnes and reduces post-op infections.   Your surgeon has ordered 5% Benzoyl Peroxide wash to be used three times prior to your surgery.     Please read the following instructions carefully and bring this form with you the day of surgery.     General bathing instructions starting two days before your surgery:    Shower using a fresh bar of anti-bacterial soap (such as Dial) and clean washcloth.  Pay special attention to the neck, shoulder and armpit area.   Wash your hair as usual with your regular shampoo.   Rinse hair and body thoroughly with warm water (not hot water) to remove shampoo and residue.   Dry with a clean towel.              Sleep in a clean bed with clean clothing.  Do not allow pets to sleep with you.     For 2 days before surgery, avoid shaving with a razor because the razor can irritate skin and make it easier to develop an infection.    Any areas of open skin  can increase the risk of a post-operative wound infection by allowing bacteria to enter and travel throughout the body.  Notify your surgeon if you have any skin wounds / rashes even if it is not near the expected surgical site.  The area will need assessed to determine if surgery should be delayed until it is healed.      First application of 5% Benzoyl Peroxide Wash two nights before surgery:                                                                Wash neck, shoulder (front, back, side) and armpit   with warm water, rinse and dry - see picture.  Gently wash the same areas with the Benzoyl Peroxide   cleanser going away from the neck for 10-20 seconds.   Work into a full lather and leave on the skin for   2 minutes for greatest effect.  Rinse thoroughly with warm water, not hot water.                     Pat dry with a clean towel.                                                            Wash your hands thoroughly.  Do not apply lotion, powder, perfume or deodorant.   Put on clean clothes.    Second application the night before surgery:  Repeat the above steps.        Third application morning of surgery:  Repeat the above steps.    Due to shoulder pain or decreased range of motion of your shoulder and arm, you may need assistance washing under the arm or the back portion of your shoulder.     Avoid further washing the areas of the skin treated with Benzoyl Peroxide for at least 1 hour.    For your convenience, you may purchase Benzoyl Peroxide at [facility] retail pharmacy.     Warning:  Let your physician know if you are allergic to Benzoyl Peroxide or have very sensitive skin and cannot use it.   Stop using and contact your surgeon if you experience any excessive scaling, itching, swelling, skin irritation or other signs of a reaction.  Keep out of eyes, ears, nose and mouth.  Do not apply to sunburned, irritated or broken area on the skin.  Avoid unwanted problems with bleaching effect by following  these tips:  Wash hands after each use.  Avoid contact with hair, clothing, furnishings or carpeting.  Wear clean, old t-shirt or clothing to bed.   Use clean, old white pillow cases and sheets to avoid discoloring your bed linens.                                                                                                                                                                                                                                                                                   Please complete the checklist below, bring it with you to the hospital                               the day of surgery and give to the Pre-op Nurse     Preoperative Skin Prep Checklist        Patient Name Label             Enter dates and ?  boxes to indicate completed    Surgery Date: _______________ Regular Shower  Benzoyl Peroxide Wash   First Application:  2 days before_______________  (Stop shaving all body parts)           Morning or Evening                        Evening    Second Application:  1 day  before________________        Morning or Evening                          Evening   Third Application:  Day of Surgery______________                           Morning                   Morning

## 2023-08-22 LAB — QT INTERVAL: 360 MS

## 2023-08-23 ENCOUNTER — OFFICE VISIT (OUTPATIENT)
Dept: ORTHOPEDIC SURGERY | Facility: CLINIC | Age: 63
End: 2023-08-23
Payer: COMMERCIAL

## 2023-08-23 VITALS — BODY MASS INDEX: 26.09 KG/M2 | TEMPERATURE: 98.6 F | WEIGHT: 141.8 LBS | HEIGHT: 62 IN

## 2023-08-23 DIAGNOSIS — Z01.818 PREOP EXAMINATION: Primary | ICD-10-CM

## 2023-08-23 NOTE — H&P (VIEW-ONLY)
History & Physical       Patient: Ellyn Sanchez    YOB: 1960    Medical Record Number: [id number]    Chief Complaints:   Chief Complaint   Patient presents with    Left Shoulder - Follow-up       History of Present Illness: 63 y.o. female who comes in today in anticipation of upcoming total shoulder replacement surgery. Reports a long history of progressively worsening pain, motion loss, and dysfunction.  Describes current pain as severe.  Has failed prolonged conservative treatment.  Denies any new complaints or issues.    Allergies:   Allergies   Allergen Reactions    Celecoxib GI Intolerance       Medications:   Home Medications:    Current Outpatient Medications:     azelastine (ASTEPRO) 0.15 % solution nasal spray, 2 sprays 2 (Two) Times a Day., Disp: , Rfl:     benzoyl peroxide 5 % external liquid, Apply 1 application  topically to the appropriate area as directed. USE AS DIRECTED PREOP, Disp: , Rfl:     Chlorhexidine Gluconate Cloth 2 % pads, Apply 1 application  topically. USE AS DIRECTED PREOP, Disp: , Rfl:     fluticasone (FLONASE) 50 MCG/ACT nasal spray, 1 spray into the nostril(s) as directed by provider Daily As Needed. Spray in each nostril., Disp: , Rfl:     gabapentin (NEURONTIN) 600 MG tablet, Take 1 tablet by mouth every night at bedtime., Disp: , Rfl:     levothyroxine (SYNTHROID, LEVOTHROID) 50 MCG tablet, Take 1 tablet by mouth 3 (Three) Times a Week. MON WED FRI, Disp: , Rfl:     levothyroxine (SYNTHROID, LEVOTHROID) 50 MCG tablet, Take 1.5 tablets by mouth 4 (Four) Times a Week. TUES  THUR   SAT  SUN, Disp: , Rfl:     naproxen sodium (ALEVE) 220 MG tablet, Take 2 tablets by mouth Every Morning. To stop before surgery, Disp: , Rfl:     traMADol (ULTRAM) 50 MG tablet, Take 1 tablet by mouth Daily., Disp: , Rfl:     valACYclovir (VALTREX) 500 MG tablet, Take  by mouth Every Morning. Take as directed for cold sores and genital herpes., Disp: , Rfl:     Past Medical History:    Diagnosis Date    Arthritis     Disease of thyroid gland     Kidney stone     Periarthritis of shoulder     Tear of meniscus of knee 2022          Past Surgical History:   Procedure Laterality Date    APPENDECTOMY      COLONOSCOPY      KIDNEY STONE SURGERY  2017          Social History     Occupational History    Not on file   Tobacco Use    Smoking status: Former     Packs/day: 1.50     Years: 10.00     Pack years: 15.00     Types: Cigarettes     Quit date: 2010     Years since quittin.1     Passive exposure: Past    Smokeless tobacco: Never   Vaping Use    Vaping Use: Never used   Substance and Sexual Activity    Alcohol use: Yes     Alcohol/week: 2.0 standard drinks     Types: 2 Drinks containing 0.5 oz of alcohol per week    Drug use: Never    Sexual activity: Not Currently     Partners: Male     Birth control/protection: Pill, None      Social History     Social History Narrative    Not on file          Family History   Problem Relation Age of Onset    Dementia Mother     Diabetes Mother     Heart disease Mother     COPD Father         with Lewy bodies    Diabetes Father     Heart disease Father     Hypertension Father     Obesity Father         Morbid    Arthritis Father     Cancer Father         big toe    Diabetes Sister     Heart disease Brother     Diabetes Brother     Diabetes Maternal Grandmother     Malig Hyperthermia Neg Hx        Review of Systems:     Constitutional:  Denies fever, shaking or chills   Eyes:  Denies change in visual acuity   HEENT:  Denies nasal congestion or sore throat   Respiratory:  Denies cough or shortness of breath   Cardiovascular:  Denies chest pain or edema   GI:  Denies abdominal pain, nausea, vomiting, bloody stools or diarrhea   Musculoskeletal:  Denies numbness tingling or loss of motor function except as outlined above in history of present illness.  Integument:  Denies rash, lesion or ulceration   Neurologic:  Denies headache or focal weakness,  "deficits      All other pertinent positives and negatives as noted above in HPI.    Physical Exam: 63 y.o. female    Vitals:    08/23/23 1348   Temp: 98.6 °F (37 °C)   TempSrc: Temporal   Weight: 64.3 kg (141 lb 12.8 oz)   Height: 157.5 cm (62\")     General:  Patient is awake and alert.  Appears in no acute distress or discomfort.    Psych:  Affect and demeanor are appropriate.    Eyes:  Conjunctiva and sclera appear grossly normal.  Eyes track well and EOM seem to be intact.    Ears:  No gross abnormalities.  Hearing adequate for the exam.    Cardiovascular:  Regular rate and rhythm.    Lungs:  Good chest expansion.  Breathing unlabored.    Lymph:  No palpable adenopathy about neck or axilla.    Neck:  Supple.  Normal ROM.  Negative Spurling's for shoulder or arm pain.    Left upper extremity:  Skin benign and intact without evidence for swelling, masses or atrophy.  No palpable masses.  No focal areas of exquisite tenderness.  Shoulder ROM limited and uncomfortable.  No evident instability or apprehension.  Good strength with resistive testing of the rotator cuff albeit with discomfort.  Good strength in the deltoid, wrist, and hand.  Intact sensation in arm, hand.  Palpable radial pulse with brisk cap refill.    Diagnostic Tests:  Lab Results   Component Value Date    GLUCOSE 89 08/17/2023    CALCIUM 9.4 08/17/2023     08/17/2023    K 4.9 08/17/2023    CO2 24.2 08/17/2023     08/17/2023    BUN 13 08/17/2023    CREATININE 0.57 08/17/2023    BCR 22.8 08/17/2023    ANIONGAP 11.8 08/17/2023     Lab Results   Component Value Date    WBC 7.44 08/17/2023    HGB 13.6 08/17/2023    HCT 41.2 08/17/2023    MCV 87.7 08/17/2023     08/17/2023     No results found for: INR, PROTIME    Imaging:   Previous x-rays of the shoulder are reviewed.  The x-rays show severe osteoarthritis with bone-on-bone degeneration, osteophyte formation, and subchondral sclerosis.  The acromiohumeral interval measures " normal.    Assessment:  Left shoulder end-stage osteoarthritis    Plan: We had a thorough discussion regarding the risks, benefits and alternatives to an arthroplasty and non-surgical management versus surgery.  I explained that surgical risks include infection, hematoma, hardware related complications including failure of fixation, loosening, fracture, subscapularis repair failure and/or rotator cuff tear necessitating revision surgery, persistent pain and/or loss of motion, iatrogenic nerve and/or blood vessel injury resulting in permanent weakness, numbness or dysfunction, DVT, PE, positioning related neuropraxia, and anesthesia related complications resulting in death.  I did explain the possible need for reverse arthroplasty depending upon the degree of retroversion and the status of the rotator cuff at the time of surgery.  I further explained the risks inherent to reverse arthroplasty as compared to a standard total shoulder.  Specifically, we discussed the risks of notching, glenoid loosening, instability, and traction related neuropraxia, any of which could result in persistent pain or problems requiring further surgery.   Lastly, we discussed the rehab and all that will be expected of the patient post operatively to ensure an optimal outcome.  The patient has acknowledged understanding and consents to proceed.    Patient is planning for hospital dismissal same day of surgery.  Denies history of DVT or metal allergy.      Date: 8/23/2023    VAMSHI Harrington

## 2023-08-23 NOTE — PROGRESS NOTES
History & Physical       Patient: Ellyn Sanchez    YOB: 1960    Medical Record Number: [id number]    Chief Complaints:   Chief Complaint   Patient presents with    Left Shoulder - Follow-up       History of Present Illness: 63 y.o. female who comes in today in anticipation of upcoming total shoulder replacement surgery. Reports a long history of progressively worsening pain, motion loss, and dysfunction.  Describes current pain as severe.  Has failed prolonged conservative treatment.  Denies any new complaints or issues.    Allergies:   Allergies   Allergen Reactions    Celecoxib GI Intolerance       Medications:   Home Medications:    Current Outpatient Medications:     azelastine (ASTEPRO) 0.15 % solution nasal spray, 2 sprays 2 (Two) Times a Day., Disp: , Rfl:     benzoyl peroxide 5 % external liquid, Apply 1 application  topically to the appropriate area as directed. USE AS DIRECTED PREOP, Disp: , Rfl:     Chlorhexidine Gluconate Cloth 2 % pads, Apply 1 application  topically. USE AS DIRECTED PREOP, Disp: , Rfl:     fluticasone (FLONASE) 50 MCG/ACT nasal spray, 1 spray into the nostril(s) as directed by provider Daily As Needed. Spray in each nostril., Disp: , Rfl:     gabapentin (NEURONTIN) 600 MG tablet, Take 1 tablet by mouth every night at bedtime., Disp: , Rfl:     levothyroxine (SYNTHROID, LEVOTHROID) 50 MCG tablet, Take 1 tablet by mouth 3 (Three) Times a Week. MON WED FRI, Disp: , Rfl:     levothyroxine (SYNTHROID, LEVOTHROID) 50 MCG tablet, Take 1.5 tablets by mouth 4 (Four) Times a Week. TUES  THUR   SAT  SUN, Disp: , Rfl:     naproxen sodium (ALEVE) 220 MG tablet, Take 2 tablets by mouth Every Morning. To stop before surgery, Disp: , Rfl:     traMADol (ULTRAM) 50 MG tablet, Take 1 tablet by mouth Daily., Disp: , Rfl:     valACYclovir (VALTREX) 500 MG tablet, Take  by mouth Every Morning. Take as directed for cold sores and genital herpes., Disp: , Rfl:     Past Medical History:    Diagnosis Date    Arthritis     Disease of thyroid gland     Kidney stone     Periarthritis of shoulder     Tear of meniscus of knee 2022          Past Surgical History:   Procedure Laterality Date    APPENDECTOMY      COLONOSCOPY      KIDNEY STONE SURGERY  2017          Social History     Occupational History    Not on file   Tobacco Use    Smoking status: Former     Packs/day: 1.50     Years: 10.00     Pack years: 15.00     Types: Cigarettes     Quit date: 2010     Years since quittin.1     Passive exposure: Past    Smokeless tobacco: Never   Vaping Use    Vaping Use: Never used   Substance and Sexual Activity    Alcohol use: Yes     Alcohol/week: 2.0 standard drinks     Types: 2 Drinks containing 0.5 oz of alcohol per week    Drug use: Never    Sexual activity: Not Currently     Partners: Male     Birth control/protection: Pill, None      Social History     Social History Narrative    Not on file          Family History   Problem Relation Age of Onset    Dementia Mother     Diabetes Mother     Heart disease Mother     COPD Father         with Lewy bodies    Diabetes Father     Heart disease Father     Hypertension Father     Obesity Father         Morbid    Arthritis Father     Cancer Father         big toe    Diabetes Sister     Heart disease Brother     Diabetes Brother     Diabetes Maternal Grandmother     Malig Hyperthermia Neg Hx        Review of Systems:     Constitutional:  Denies fever, shaking or chills   Eyes:  Denies change in visual acuity   HEENT:  Denies nasal congestion or sore throat   Respiratory:  Denies cough or shortness of breath   Cardiovascular:  Denies chest pain or edema   GI:  Denies abdominal pain, nausea, vomiting, bloody stools or diarrhea   Musculoskeletal:  Denies numbness tingling or loss of motor function except as outlined above in history of present illness.  Integument:  Denies rash, lesion or ulceration   Neurologic:  Denies headache or focal weakness,  "deficits      All other pertinent positives and negatives as noted above in HPI.    Physical Exam: 63 y.o. female    Vitals:    08/23/23 1348   Temp: 98.6 øF (37 øC)   TempSrc: Temporal   Weight: 64.3 kg (141 lb 12.8 oz)   Height: 157.5 cm (62\")     General:  Patient is awake and alert.  Appears in no acute distress or discomfort.    Psych:  Affect and demeanor are appropriate.    Eyes:  Conjunctiva and sclera appear grossly normal.  Eyes track well and EOM seem to be intact.    Ears:  No gross abnormalities.  Hearing adequate for the exam.    Cardiovascular:  Regular rate and rhythm.    Lungs:  Good chest expansion.  Breathing unlabored.    Lymph:  No palpable adenopathy about neck or axilla.    Neck:  Supple.  Normal ROM.  Negative Spurling's for shoulder or arm pain.    Left upper extremity:  Skin benign and intact without evidence for swelling, masses or atrophy.  No palpable masses.  No focal areas of exquisite tenderness.  Shoulder ROM limited and uncomfortable.  No evident instability or apprehension.  Good strength with resistive testing of the rotator cuff albeit with discomfort.  Good strength in the deltoid, wrist, and hand.  Intact sensation in arm, hand.  Palpable radial pulse with brisk cap refill.    Diagnostic Tests:  Lab Results   Component Value Date    GLUCOSE 89 08/17/2023    CALCIUM 9.4 08/17/2023     08/17/2023    K 4.9 08/17/2023    CO2 24.2 08/17/2023     08/17/2023    BUN 13 08/17/2023    CREATININE 0.57 08/17/2023    BCR 22.8 08/17/2023    ANIONGAP 11.8 08/17/2023     Lab Results   Component Value Date    WBC 7.44 08/17/2023    HGB 13.6 08/17/2023    HCT 41.2 08/17/2023    MCV 87.7 08/17/2023     08/17/2023     No results found for: INR, PROTIME    Imaging:   Previous x-rays of the shoulder are reviewed.  The x-rays show severe osteoarthritis with bone-on-bone degeneration, osteophyte formation, and subchondral sclerosis.  The acromiohumeral interval measures " normal.    Assessment:  Left shoulder end-stage osteoarthritis    Plan: We had a thorough discussion regarding the risks, benefits and alternatives to an arthroplasty and non-surgical management versus surgery.  I explained that surgical risks include infection, hematoma, hardware related complications including failure of fixation, loosening, fracture, subscapularis repair failure and/or rotator cuff tear necessitating revision surgery, persistent pain and/or loss of motion, iatrogenic nerve and/or blood vessel injury resulting in permanent weakness, numbness or dysfunction, DVT, PE, positioning related neuropraxia, and anesthesia related complications resulting in death.  I did explain the possible need for reverse arthroplasty depending upon the degree of retroversion and the status of the rotator cuff at the time of surgery.  I further explained the risks inherent to reverse arthroplasty as compared to a standard total shoulder.  Specifically, we discussed the risks of notching, glenoid loosening, instability, and traction related neuropraxia, any of which could result in persistent pain or problems requiring further surgery.   Lastly, we discussed the rehab and all that will be expected of the patient post operatively to ensure an optimal outcome.  The patient has acknowledged understanding and consents to proceed.    Patient is planning for hospital dismissal same day of surgery.  Denies history of DVT or metal allergy.      Date: 8/23/2023    VAMSHI Harrington

## 2023-08-31 ENCOUNTER — HOSPITAL ENCOUNTER (OUTPATIENT)
Facility: HOSPITAL | Age: 63
Discharge: HOME | End: 2023-08-31
Attending: ORTHOPAEDIC SURGERY | Admitting: ORTHOPAEDIC SURGERY
Payer: COMMERCIAL

## 2023-08-31 ENCOUNTER — ANESTHESIA EVENT (OUTPATIENT)
Dept: PERIOP | Facility: HOSPITAL | Age: 63
End: 2023-08-31
Payer: COMMERCIAL

## 2023-08-31 ENCOUNTER — APPOINTMENT (OUTPATIENT)
Dept: GENERAL RADIOLOGY | Facility: HOSPITAL | Age: 63
End: 2023-08-31
Payer: COMMERCIAL

## 2023-08-31 ENCOUNTER — ANESTHESIA (OUTPATIENT)
Dept: PERIOP | Facility: HOSPITAL | Age: 63
End: 2023-08-31
Payer: COMMERCIAL

## 2023-08-31 VITALS
SYSTOLIC BLOOD PRESSURE: 119 MMHG | RESPIRATION RATE: 16 BRPM | TEMPERATURE: 97.4 F | DIASTOLIC BLOOD PRESSURE: 79 MMHG | HEART RATE: 88 BPM | OXYGEN SATURATION: 93 %

## 2023-08-31 DIAGNOSIS — M19.012 PRIMARY OSTEOARTHRITIS OF LEFT SHOULDER: ICD-10-CM

## 2023-08-31 DIAGNOSIS — Z96.612 HISTORY OF LEFT SHOULDER REPLACEMENT: Primary | ICD-10-CM

## 2023-08-31 PROCEDURE — 97535 SELF CARE MNGMENT TRAINING: CPT

## 2023-08-31 PROCEDURE — 25010000002 VANCOMYCIN PER 500 MG: Performed by: ORTHOPAEDIC SURGERY

## 2023-08-31 PROCEDURE — 25010000002 DEXAMETHASONE SODIUM PHOSPHATE 20 MG/5ML SOLUTION: Performed by: NURSE ANESTHETIST, CERTIFIED REGISTERED

## 2023-08-31 PROCEDURE — 97165 OT EVAL LOW COMPLEX 30 MIN: CPT

## 2023-08-31 PROCEDURE — 25010000002 MIDAZOLAM PER 1 MG: Performed by: ANESTHESIOLOGY

## 2023-08-31 PROCEDURE — 25010000002 PHENYLEPHRINE 10 MG/ML SOLUTION: Performed by: NURSE ANESTHETIST, CERTIFIED REGISTERED

## 2023-08-31 PROCEDURE — C1776 JOINT DEVICE (IMPLANTABLE): HCPCS | Performed by: ORTHOPAEDIC SURGERY

## 2023-08-31 PROCEDURE — C9290 INJ, BUPIVACAINE LIPOSOME: HCPCS | Performed by: ANESTHESIOLOGY

## 2023-08-31 PROCEDURE — 73020 X-RAY EXAM OF SHOULDER: CPT

## 2023-08-31 PROCEDURE — 25010000002 ONDANSETRON PER 1 MG: Performed by: NURSE ANESTHETIST, CERTIFIED REGISTERED

## 2023-08-31 PROCEDURE — 97110 THERAPEUTIC EXERCISES: CPT

## 2023-08-31 PROCEDURE — 25010000002 CEFAZOLIN IN DEXTROSE 2-4 GM/100ML-% SOLUTION: Performed by: ORTHOPAEDIC SURGERY

## 2023-08-31 PROCEDURE — C1713 ANCHOR/SCREW BN/BN,TIS/BN: HCPCS | Performed by: ORTHOPAEDIC SURGERY

## 2023-08-31 PROCEDURE — 23472 RECONSTRUCT SHOULDER JOINT: CPT | Performed by: ORTHOPAEDIC SURGERY

## 2023-08-31 PROCEDURE — 25010000002 FENTANYL CITRATE (PF) 50 MCG/ML SOLUTION: Performed by: ANESTHESIOLOGY

## 2023-08-31 PROCEDURE — 25010000002 PROPOFOL 10 MG/ML EMULSION: Performed by: NURSE ANESTHETIST, CERTIFIED REGISTERED

## 2023-08-31 PROCEDURE — 25010000002 SUGAMMADEX 200 MG/2ML SOLUTION: Performed by: NURSE ANESTHETIST, CERTIFIED REGISTERED

## 2023-08-31 PROCEDURE — 0 BUPIVACAINE LIPOSOME 1.3 % SUSPENSION: Performed by: ANESTHESIOLOGY

## 2023-08-31 DEVICE — IMPLANTABLE DEVICE
Type: IMPLANTABLE DEVICE | Site: SHOULDER | Status: FUNCTIONAL
Brand: COMPREHENSIVE SHOULDER SYSTEM

## 2023-08-31 DEVICE — CMT BONE R 1X40: Type: IMPLANTABLE DEVICE | Site: SHOULDER | Status: FUNCTIONAL

## 2023-08-31 DEVICE — TOTL SHLDER: Type: IMPLANTABLE DEVICE | Status: FUNCTIONAL

## 2023-08-31 DEVICE — HD HUM COMPRNSV VERSADIAL 42X21X43MM: Type: IMPLANTABLE DEVICE | Site: SHOULDER | Status: FUNCTIONAL

## 2023-08-31 DEVICE — PEG MOD GELN ALLIANCE 4 SZ 4: Type: IMPLANTABLE DEVICE | Site: SHOULDER | Status: FUNCTIONAL

## 2023-08-31 DEVICE — STEM HUM/SHLDR COMPREHENSIVE MIC 11X55MM: Type: IMPLANTABLE DEVICE | Site: SHOULDER | Status: FUNCTIONAL

## 2023-08-31 DEVICE — DEV CONTRL TISS STRATAFIX SPIRAL MNCRYL UD 3/0 PLS 30CM: Type: IMPLANTABLE DEVICE | Site: SHOULDER | Status: FUNCTIONAL

## 2023-08-31 DEVICE — SUT NONABS MAXBRAID/PE NMBR2 C7 38IN BLU 900335: Type: IMPLANTABLE DEVICE | Site: SHOULDER | Status: FUNCTIONAL

## 2023-08-31 DEVICE — POST MOD GLEN ALLIANCE/TM: Type: IMPLANTABLE DEVICE | Site: SHOULDER | Status: FUNCTIONAL

## 2023-08-31 RX ORDER — ONDANSETRON 2 MG/ML
4 INJECTION INTRAMUSCULAR; INTRAVENOUS ONCE AS NEEDED
Status: DISCONTINUED | OUTPATIENT
Start: 2023-08-31 | End: 2023-08-31 | Stop reason: HOSPADM

## 2023-08-31 RX ORDER — PROMETHAZINE HYDROCHLORIDE 25 MG/1
25 SUPPOSITORY RECTAL ONCE AS NEEDED
Status: DISCONTINUED | OUTPATIENT
Start: 2023-08-31 | End: 2023-08-31 | Stop reason: HOSPADM

## 2023-08-31 RX ORDER — LIDOCAINE HYDROCHLORIDE 20 MG/ML
INJECTION, SOLUTION INFILTRATION; PERINEURAL AS NEEDED
Status: DISCONTINUED | OUTPATIENT
Start: 2023-08-31 | End: 2023-08-31 | Stop reason: SURG

## 2023-08-31 RX ORDER — ACETAMINOPHEN 500 MG
1000 TABLET ORAL ONCE
Status: COMPLETED | OUTPATIENT
Start: 2023-08-31 | End: 2023-08-31

## 2023-08-31 RX ORDER — DIPHENHYDRAMINE HYDROCHLORIDE 50 MG/ML
12.5 INJECTION INTRAMUSCULAR; INTRAVENOUS
Status: DISCONTINUED | OUTPATIENT
Start: 2023-08-31 | End: 2023-08-31 | Stop reason: HOSPADM

## 2023-08-31 RX ORDER — TRANEXAMIC ACID 100 MG/ML
INJECTION, SOLUTION INTRAVENOUS AS NEEDED
Status: DISCONTINUED | OUTPATIENT
Start: 2023-08-31 | End: 2023-08-31 | Stop reason: SURG

## 2023-08-31 RX ORDER — MIDAZOLAM HYDROCHLORIDE 1 MG/ML
1 INJECTION INTRAMUSCULAR; INTRAVENOUS
Status: DISCONTINUED | OUTPATIENT
Start: 2023-08-31 | End: 2023-08-31 | Stop reason: HOSPADM

## 2023-08-31 RX ORDER — CEFAZOLIN SODIUM 2 G/100ML
2 INJECTION, SOLUTION INTRAVENOUS ONCE
Status: COMPLETED | OUTPATIENT
Start: 2023-08-31 | End: 2023-08-31

## 2023-08-31 RX ORDER — LABETALOL HYDROCHLORIDE 5 MG/ML
5 INJECTION, SOLUTION INTRAVENOUS
Status: DISCONTINUED | OUTPATIENT
Start: 2023-08-31 | End: 2023-08-31 | Stop reason: HOSPADM

## 2023-08-31 RX ORDER — FENTANYL CITRATE 50 UG/ML
50 INJECTION, SOLUTION INTRAMUSCULAR; INTRAVENOUS
Status: DISCONTINUED | OUTPATIENT
Start: 2023-08-31 | End: 2023-08-31 | Stop reason: HOSPADM

## 2023-08-31 RX ORDER — VANCOMYCIN HYDROCHLORIDE 1 G/200ML
15 INJECTION, SOLUTION INTRAVENOUS ONCE
Status: COMPLETED | OUTPATIENT
Start: 2023-08-31 | End: 2023-08-31

## 2023-08-31 RX ORDER — DOCUSATE SODIUM 100 MG/1
100 CAPSULE, LIQUID FILLED ORAL 2 TIMES DAILY
Qty: 60 CAPSULE | Refills: 0 | Status: SHIPPED | OUTPATIENT
Start: 2023-08-31

## 2023-08-31 RX ORDER — MELOXICAM 15 MG/1
15 TABLET ORAL ONCE
Status: COMPLETED | OUTPATIENT
Start: 2023-08-31 | End: 2023-08-31

## 2023-08-31 RX ORDER — ONDANSETRON 2 MG/ML
INJECTION INTRAMUSCULAR; INTRAVENOUS AS NEEDED
Status: DISCONTINUED | OUTPATIENT
Start: 2023-08-31 | End: 2023-08-31 | Stop reason: SURG

## 2023-08-31 RX ORDER — OXYCODONE AND ACETAMINOPHEN 7.5; 325 MG/1; MG/1
1 TABLET ORAL EVERY 4 HOURS PRN
Status: DISCONTINUED | OUTPATIENT
Start: 2023-08-31 | End: 2023-08-31 | Stop reason: HOSPADM

## 2023-08-31 RX ORDER — SODIUM CHLORIDE, SODIUM LACTATE, POTASSIUM CHLORIDE, CALCIUM CHLORIDE 600; 310; 30; 20 MG/100ML; MG/100ML; MG/100ML; MG/100ML
9 INJECTION, SOLUTION INTRAVENOUS CONTINUOUS
Status: DISCONTINUED | OUTPATIENT
Start: 2023-08-31 | End: 2023-08-31 | Stop reason: HOSPADM

## 2023-08-31 RX ORDER — FLUMAZENIL 0.1 MG/ML
0.2 INJECTION INTRAVENOUS AS NEEDED
Status: DISCONTINUED | OUTPATIENT
Start: 2023-08-31 | End: 2023-08-31 | Stop reason: HOSPADM

## 2023-08-31 RX ORDER — PROMETHAZINE HYDROCHLORIDE 25 MG/1
25 TABLET ORAL ONCE AS NEEDED
Status: DISCONTINUED | OUTPATIENT
Start: 2023-08-31 | End: 2023-08-31 | Stop reason: HOSPADM

## 2023-08-31 RX ORDER — HYDRALAZINE HYDROCHLORIDE 20 MG/ML
5 INJECTION INTRAMUSCULAR; INTRAVENOUS
Status: DISCONTINUED | OUTPATIENT
Start: 2023-08-31 | End: 2023-08-31 | Stop reason: HOSPADM

## 2023-08-31 RX ORDER — SODIUM CHLORIDE 0.9 % (FLUSH) 0.9 %
3 SYRINGE (ML) INJECTION EVERY 12 HOURS SCHEDULED
Status: DISCONTINUED | OUTPATIENT
Start: 2023-08-31 | End: 2023-08-31 | Stop reason: HOSPADM

## 2023-08-31 RX ORDER — ONDANSETRON 4 MG/1
4 TABLET, FILM COATED ORAL EVERY 8 HOURS PRN
Qty: 12 TABLET | Refills: 0 | Status: SHIPPED | OUTPATIENT
Start: 2023-08-31 | End: 2023-09-06 | Stop reason: SDUPTHER

## 2023-08-31 RX ORDER — CEFAZOLIN SODIUM 2 G/100ML
2 INJECTION, SOLUTION INTRAVENOUS EVERY 8 HOURS
Status: DISCONTINUED | OUTPATIENT
Start: 2023-08-31 | End: 2023-08-31 | Stop reason: HOSPADM

## 2023-08-31 RX ORDER — PHENYLEPHRINE HYDROCHLORIDE 10 MG/ML
INJECTION INTRAVENOUS AS NEEDED
Status: DISCONTINUED | OUTPATIENT
Start: 2023-08-31 | End: 2023-08-31 | Stop reason: SURG

## 2023-08-31 RX ORDER — MAGNESIUM HYDROXIDE 1200 MG/15ML
LIQUID ORAL AS NEEDED
Status: DISCONTINUED | OUTPATIENT
Start: 2023-08-31 | End: 2023-08-31 | Stop reason: HOSPADM

## 2023-08-31 RX ORDER — IPRATROPIUM BROMIDE AND ALBUTEROL SULFATE 2.5; .5 MG/3ML; MG/3ML
3 SOLUTION RESPIRATORY (INHALATION) ONCE AS NEEDED
Status: DISCONTINUED | OUTPATIENT
Start: 2023-08-31 | End: 2023-08-31 | Stop reason: HOSPADM

## 2023-08-31 RX ORDER — HYDROCODONE BITARTRATE AND ACETAMINOPHEN 7.5; 325 MG/1; MG/1
1 TABLET ORAL EVERY 4 HOURS PRN
Qty: 42 TABLET | Refills: 0 | Status: SHIPPED | OUTPATIENT
Start: 2023-08-31 | End: 2023-09-06 | Stop reason: SDUPTHER

## 2023-08-31 RX ORDER — ACETAMINOPHEN 325 MG/1
650 TABLET ORAL 2 TIMES DAILY PRN
Qty: 60 TABLET | Refills: 0 | Status: SHIPPED | OUTPATIENT
Start: 2023-08-31

## 2023-08-31 RX ORDER — DROPERIDOL 2.5 MG/ML
0.62 INJECTION, SOLUTION INTRAMUSCULAR; INTRAVENOUS
Status: DISCONTINUED | OUTPATIENT
Start: 2023-08-31 | End: 2023-08-31 | Stop reason: HOSPADM

## 2023-08-31 RX ORDER — NALOXONE HCL 0.4 MG/ML
0.2 VIAL (ML) INJECTION AS NEEDED
Status: DISCONTINUED | OUTPATIENT
Start: 2023-08-31 | End: 2023-08-31 | Stop reason: HOSPADM

## 2023-08-31 RX ORDER — DEXAMETHASONE SODIUM PHOSPHATE 4 MG/ML
INJECTION, SOLUTION INTRA-ARTICULAR; INTRALESIONAL; INTRAMUSCULAR; INTRAVENOUS; SOFT TISSUE AS NEEDED
Status: DISCONTINUED | OUTPATIENT
Start: 2023-08-31 | End: 2023-08-31 | Stop reason: SURG

## 2023-08-31 RX ORDER — FAMOTIDINE 10 MG/ML
20 INJECTION, SOLUTION INTRAVENOUS ONCE
Status: COMPLETED | OUTPATIENT
Start: 2023-08-31 | End: 2023-08-31

## 2023-08-31 RX ORDER — HYDROCODONE BITARTRATE AND ACETAMINOPHEN 7.5; 325 MG/1; MG/1
1 TABLET ORAL ONCE AS NEEDED
Status: DISCONTINUED | OUTPATIENT
Start: 2023-08-31 | End: 2023-08-31 | Stop reason: HOSPADM

## 2023-08-31 RX ORDER — ONDANSETRON 4 MG/1
4 TABLET, FILM COATED ORAL ONCE AS NEEDED
Status: DISCONTINUED | OUTPATIENT
Start: 2023-08-31 | End: 2023-08-31 | Stop reason: HOSPADM

## 2023-08-31 RX ORDER — HYDROMORPHONE HYDROCHLORIDE 1 MG/ML
0.5 INJECTION, SOLUTION INTRAMUSCULAR; INTRAVENOUS; SUBCUTANEOUS
Status: DISCONTINUED | OUTPATIENT
Start: 2023-08-31 | End: 2023-08-31 | Stop reason: HOSPADM

## 2023-08-31 RX ORDER — BUPIVACAINE HYDROCHLORIDE AND EPINEPHRINE 2.5; 5 MG/ML; UG/ML
INJECTION, SOLUTION EPIDURAL; INFILTRATION; INTRACAUDAL; PERINEURAL
Status: COMPLETED | OUTPATIENT
Start: 2023-08-31 | End: 2023-08-31

## 2023-08-31 RX ORDER — ROCURONIUM BROMIDE 10 MG/ML
INJECTION, SOLUTION INTRAVENOUS AS NEEDED
Status: DISCONTINUED | OUTPATIENT
Start: 2023-08-31 | End: 2023-08-31 | Stop reason: SURG

## 2023-08-31 RX ORDER — HYDROCODONE BITARTRATE AND ACETAMINOPHEN 7.5; 325 MG/1; MG/1
2 TABLET ORAL ONCE AS NEEDED
Status: DISCONTINUED | OUTPATIENT
Start: 2023-08-31 | End: 2023-08-31 | Stop reason: HOSPADM

## 2023-08-31 RX ORDER — EPHEDRINE SULFATE 50 MG/ML
5 INJECTION, SOLUTION INTRAVENOUS ONCE AS NEEDED
Status: DISCONTINUED | OUTPATIENT
Start: 2023-08-31 | End: 2023-08-31 | Stop reason: HOSPADM

## 2023-08-31 RX ORDER — SODIUM CHLORIDE 0.9 % (FLUSH) 0.9 %
3-10 SYRINGE (ML) INJECTION AS NEEDED
Status: DISCONTINUED | OUTPATIENT
Start: 2023-08-31 | End: 2023-08-31 | Stop reason: HOSPADM

## 2023-08-31 RX ORDER — ACETAMINOPHEN 325 MG/1
1000 TABLET ORAL ONCE
Status: DISCONTINUED | OUTPATIENT
Start: 2023-08-31 | End: 2023-08-31 | Stop reason: HOSPADM

## 2023-08-31 RX ORDER — PROPOFOL 10 MG/ML
VIAL (ML) INTRAVENOUS AS NEEDED
Status: DISCONTINUED | OUTPATIENT
Start: 2023-08-31 | End: 2023-08-31 | Stop reason: SURG

## 2023-08-31 RX ORDER — PREGABALIN 75 MG/1
150 CAPSULE ORAL ONCE
Status: COMPLETED | OUTPATIENT
Start: 2023-08-31 | End: 2023-08-31

## 2023-08-31 RX ADMIN — BUPIVACAINE 10 ML: 13.3 INJECTION, SUSPENSION, LIPOSOMAL INFILTRATION at 11:12

## 2023-08-31 RX ADMIN — FENTANYL CITRATE 50 MCG: 50 INJECTION, SOLUTION INTRAMUSCULAR; INTRAVENOUS at 11:07

## 2023-08-31 RX ADMIN — CEFAZOLIN SODIUM 2 G: 2 INJECTION, SOLUTION INTRAVENOUS at 11:18

## 2023-08-31 RX ADMIN — MELOXICAM 15 MG: 15 TABLET ORAL at 10:55

## 2023-08-31 RX ADMIN — MIDAZOLAM 1 MG: 1 INJECTION INTRAMUSCULAR; INTRAVENOUS at 11:06

## 2023-08-31 RX ADMIN — PREGABALIN 150 MG: 75 CAPSULE ORAL at 10:55

## 2023-08-31 RX ADMIN — DEXAMETHASONE SODIUM PHOSPHATE 8 MG: 4 INJECTION, SOLUTION INTRAMUSCULAR; INTRAVENOUS at 11:36

## 2023-08-31 RX ADMIN — TRANEXAMIC ACID 1000 MG: 100 INJECTION INTRAVENOUS at 11:56

## 2023-08-31 RX ADMIN — BUPIVACAINE HYDROCHLORIDE AND EPINEPHRINE BITARTRATE 20 ML: 2.5; .005 INJECTION, SOLUTION EPIDURAL; INFILTRATION; INTRACAUDAL; PERINEURAL at 11:12

## 2023-08-31 RX ADMIN — PHENYLEPHRINE HYDROCHLORIDE 100 MCG: 10 INJECTION INTRAVENOUS at 12:36

## 2023-08-31 RX ADMIN — PHENYLEPHRINE HYDROCHLORIDE 100 MCG: 10 INJECTION INTRAVENOUS at 12:16

## 2023-08-31 RX ADMIN — SODIUM CHLORIDE, POTASSIUM CHLORIDE, SODIUM LACTATE AND CALCIUM CHLORIDE 9 ML/HR: 600; 310; 30; 20 INJECTION, SOLUTION INTRAVENOUS at 11:03

## 2023-08-31 RX ADMIN — ONDANSETRON 4 MG: 2 INJECTION INTRAMUSCULAR; INTRAVENOUS at 12:30

## 2023-08-31 RX ADMIN — ROCURONIUM BROMIDE 40 MG: 10 INJECTION, SOLUTION INTRAVENOUS at 11:32

## 2023-08-31 RX ADMIN — VANCOMYCIN HYDROCHLORIDE 1000 MG: 1 INJECTION, SOLUTION INTRAVENOUS at 11:02

## 2023-08-31 RX ADMIN — LIDOCAINE HYDROCHLORIDE 60 MG: 20 INJECTION, SOLUTION INFILTRATION; PERINEURAL at 11:32

## 2023-08-31 RX ADMIN — PROPOFOL 150 MG: 10 INJECTION, EMULSION INTRAVENOUS at 11:32

## 2023-08-31 RX ADMIN — PHENYLEPHRINE HYDROCHLORIDE 100 MCG: 10 INJECTION INTRAVENOUS at 12:26

## 2023-08-31 RX ADMIN — FAMOTIDINE 20 MG: 10 INJECTION INTRAVENOUS at 11:03

## 2023-08-31 RX ADMIN — ACETAMINOPHEN 1000 MG: 500 TABLET ORAL at 10:55

## 2023-08-31 RX ADMIN — SUGAMMADEX 200 MG: 100 INJECTION, SOLUTION INTRAVENOUS at 12:41

## 2023-08-31 NOTE — THERAPY DISCHARGE NOTE
Acute Care - Occupational Therapy Discharge  [facility]    Patient Name: Ellyn Sanchez  : 1960    MRN: [id number]                              Today's Date: 2023       Admit Date: 2023    Visit Dx:     ICD-10-CM ICD-9-CM   1. History of left shoulder replacement  Z96.612 V43.61   2. Primary osteoarthritis of left shoulder  M19.012 715.11     Patient Active Problem List   Diagnosis    Shoulder arthritis    Primary osteoarthritis of left shoulder     Past Medical History:   Diagnosis Date    Arthritis     Disease of thyroid gland     Kidney stone     Periarthritis of shoulder 2010    Tear of meniscus of knee 2022     Past Surgical History:   Procedure Laterality Date    APPENDECTOMY      COLONOSCOPY      KIDNEY STONE SURGERY        General Information       Row Name 23 1531          OT Time and Intention    Document Type evaluation;discharge evaluation/summary;therapy note (daily note)  -LE     Mode of Treatment individual therapy;occupational therapy  -       Row Name 23 1531          General Information    Patient Profile Reviewed yes  -LE     Prior Level of Function independent:  -LE     Existing Precautions/Restrictions --  Sling except hygiene and exercises. No push, pull, weight bear surgery UE. Precautions listed on HEP and review with pt and friend  -       Row Name 23 153          Living Environment    People in Home alone  lives alone.  d/c to friends home at least until block wears off.  -       Row Name 23 153          Cognition    Orientation Status (Cognition) oriented x 4  -       Row Name 23 1531          Safety Issues, Functional Mobility    Comment, Safety Issues/Impairments (Mobility) Recommend helper hover and walk alongside pt while block in place. Ed pt use caution when sit and stand to avoid surgery arm from swinging out while block in place.  -LE               User Key  (r) = Recorded By, (t) = Taken By, (c) = Cosigned  By      Initials Name Provider Type    Margie Horton OTSTEPHANIE Occupational Therapist                   Mobility/ADL's       Row Name 08/31/23 1532          Bed Mobility    Bed Mobility supine-sit  -LE     Supine-Sit [place] (Bed Mobility) standby assist  -LE       Row Name 08/31/23 1532          Transfers    Transfers sit-stand transfer;bed-chair transfer;stand-sit transfer  -LE       Row Name 08/31/23 1532          Bed-Chair Transfer    Bed-Chair [place] (Transfers) standby assist;contact guard  -LE     Comment, (Bed-Chair Transfer) guard L UE.  -LE       Row Name 08/31/23 1532          Sit-Stand Transfer    Sit-Stand [place] (Transfers) standby assist  -LE       Row Name 08/31/23 1532          Stand-Sit Transfer    Stand-Sit [place] (Transfers) standby assist  -LE       Row Name 08/31/23 1532          Functional Mobility    Functional Mobility- Ind. Level contact guard assist  -LE     Functional Mobility- Comment walk to chair.  OT guards L UE.  -LE       Row Name 08/31/23 1532          Activities of Daily Living    BADL Assessment/Intervention toileting;feeding;grooming;upper body dressing;bathing;lower body dressing  -LE       Row Name 08/31/23 1532          Upper Body Dressing Assessment/Training    [place]vert Level (Upper Body Dressing) don;front opening garment;set up;verbal cues  Ed thread tech to thread sleeve over surgery arm first.   Ed purpose, proper positioning and how to galina/doff sling. Ed may need assist with task at d/c.  -LE     Position (Upper Body Dressing) supported sitting  -LE     Comment, (Upper Body Dressing) OT don/doff sling and ed pt on how to reposition and monitor to keep UE supported.  -LE       Row Name 08/31/23 1532          Lower Body Dressing Assessment/Training    [place] Level (Lower Body Dressing) don;shoes/slippers;socks;pants/bottoms  -LE     Position (Lower Body Dressing) supported sitting  -LE     Comment, (Lower Body Dressing) OT ed one hand tech  for donning socks.  -LE               User Key  (r) = Recorded By, (t) = Taken By, (c) = Cosigned By      Initials Name Provider Type    Margie Horton OTR Occupational Therapist                   Obj/Interventions       [facility] Name 08/31/23 1534          Sensory Assessment (Somatosensory)    Sensory Assessment Block to surgery UE.  -[facility] Name 08/31/23 1534          Range of Motion Comprehensive    Comment, General Range of Motion slight movement L fingers.  -[facility] Name 08/31/23 1534          Motor Skills    Therapeutic Exercise --  Issue and demo shoulder HEP and return demo by pt. using non surgery UE & OT demo pendulum.   Ed complete HEP 1X10, 5-6 times a day and to hold exercises on own until block worn off & full sensation returned.  -LE               User Key  (r) = Recorded By, (t) = Taken By, (c) = Cosigned By      Initials Name Provider Type    Margie Horton OTR Occupational Therapist                   Goals/Plan       [facility] Name 08/31/23 1537          ROM Goal 1 (OT)    ROM Goal 1 (OT) Pt and friend to return demo and verbalized understanding of HEP for UE.  -LE     Time Frame (ROM Goal 1, OT) 1 day  -LE     Progress/Outcome (ROM Goal 1, OT) goal met  -LE       [facility] Name 08/31/23 1537          Problem Specific Goal 1 (OT)    Problem Specific Goal 1 (OT) Pt and friend to verbalized understanding of precautions, dressing tech with immobilized UE and purpose and position of sling.  -LE     Time Frame (Problem Specific Goal 1, OT) 1 day  -LE     Progress/Outcome (Problem Specific Goal 1, OT) goal met  -LE               User Key  (r) = Recorded By, (t) = Taken By, (c) = Cosigned By      Initials Name Provider Type    Margie Horton OTR Occupational Therapist                   Clinical Impression       Row Name 08/31/23 1535          Pain Assessment    Pretreatment Pain Rating 0/10 - no pain  -LE       Row Name 08/31/23 1535          Plan of Care Review    Plan of Care Reviewed With  patient;friend  -LE     Outcome Evaluation Pt POD#0 L TSA and seen by OT for UE HEP review and demo and ed/demo ADL tasks and sling use.  Pt plans to d/c today to friends home who is present for education and prepared to assist as needed at d/c.  No further skilled acute care OT needs.  -EDUARDO       Row Name 08/31/23 1535          Therapy Assessment/Plan (OT)    Therapy Frequency (OT) evaluation only  -EDUARDO       Row Name 08/31/23 1535          Therapy Plan Review/Discharge Plan (OT)    Anticipated Discharge Disposition (OT) home with assist  OP PT per MD order at follow up visit  -EDUARDO       Row Name 08/31/23 1535          Vital Signs    O2 Delivery Pre Treatment room air  -LE     Pre Patient Position Supine  -LE     Intra Patient Position Standing  -LE     Post Patient Position Sitting  -EDUARDO       Row Name 08/31/23 1535          Positioning and Restraints    Pre-Treatment Position in bed  -LE     Post Treatment Position chair  -LE     In Chair notified nsg;reclined;call light within reach;encouraged to call for assist;with family/caregiver;LUE elevated  -LE               User Key  (r) = Recorded By, (t) = Taken By, (c) = Cosigned By      Initials Name Provider Type    Margie Horton, HELGAR Occupational Therapist                   Outcome Measures       Row Name 08/31/23 1539          How much help from another is currently needed...    Putting on and taking off regular lower body clothing? 3  -LE     Bathing (including washing, rinsing, and drying) 3  -LE     Toileting (which includes using toilet bed pan or urinal) 3  -LE     Putting on and taking off regular upper body clothing 2  -LE     Taking care of personal grooming (such as brushing teeth) 3  -LE     Eating meals 3  -LE     AM-PAC 6 Clicks Score (OT) 17  -LE       Row Name 08/31/23 1539          Functional Assessment    Outcome Measure Options AM-PAC 6 Clicks Daily Activity (OT)  -LE               User Key  (r) = Recorded By, (t) = Taken By, (c) = Cosigned By       Initials Name Provider Type    Margie Horton OTR Occupational Therapist                  Occupational Therapy Education       Title: PT OT SLP Therapies (Done)       Topic: Occupational Therapy (Done)       Point: ADL training (Done)       Description:   Instruct learner(s) on proper safety adaptation and remediation techniques during self care or transfers.   Instruct in proper use of assistive devices.                  Learning Progress Summary             Patient Acceptance, E,TB,D,H, DU,VU by EDUARDO at 8/31/2023 1540   Other Acceptance, E,TB,D,H, DU,VU by EDUARDO at 8/31/2023 1540                         Point: Home exercise program (Done)       Description:   Instruct learner(s) on appropriate technique for monitoring, assisting and/or progressing therapeutic exercises/activities.                  Learning Progress Summary             Patient Acceptance, E,TB,D,H, DU,VU by EDUARDO at 8/31/2023 1540   Other Acceptance, E,TB,D,H, DU,VU by EDUARDO at 8/31/2023 1540                         Point: Precautions (Done)       Description:   Instruct learner(s) on prescribed precautions during self-care and functional transfers.                  Learning Progress Summary             Patient Acceptance, E,TB,D,H, DU,VU by EDUARDO at 8/31/2023 1540   Other Acceptance, E,TB,D,H, DU,VU by EDUARDO at 8/31/2023 1540                                         User Key       Initials Effective Dates Name Provider Type Discipline    EDUARDO 06/16/21 -  Margie Alba OTR Occupational Therapist OT                  OT Recommendation and Plan  Therapy Frequency (OT): evaluation only  Plan of Care Review  Plan of Care Reviewed With: patient, friend  Outcome Evaluation: Pt POD#0 L TSA and seen by OT for UE HEP review and demo and ed/demo ADL tasks and sling use.  Pt plans to d/c today to friends home who is present for education and prepared to assist as needed at d/c.  No further skilled acute care OT needs.  Plan of Care Reviewed With: patient, friend  Outcome  Evaluation: Pt POD#0 L TSA and seen by OT for UE HEP review and demo and ed/demo ADL tasks and sling use.  Pt plans to d/c today to friends home who is present for education and prepared to assist as needed at d/c.  No further skilled acute care OT needs.     Time Calculation:   Evaluation Complexity (OT)  Review Occupational Profile/Medical/Therapy History Complexity: brief/low complexity  Assessment, Occupational Performance/Identification of Deficit Complexity: 1-3 performance deficits  Clinical Decision Making Complexity (OT): problem focused assessment/low complexity  Overall Complexity of Evaluation (OT): low complexity     Time Calculation- OT       Row Name 08/31/23 1540             Time Calculation- OT    OT Start Time 1500  -LE      OT Stop Time 1528  -LE      OT Time Calculation (min) 28 min  -LE      Total Timed Code Minutes- OT 23 minute(s)  -LE      OT Received On 08/31/23  -LE         Timed Charges    66417 - OT Therapeutic Exercise Minutes 10  -LE      80169 - OT Self Care/Mgmt Minutes 13  -LE         Total Minutes    Timed Charges Total Minutes 23  -LE       Total Minutes 23  -LE                User Key  (r) = Recorded By, (t) = Taken By, (c) = Cosigned By      Initials Name Provider Type    LE Margie Alba OTR Occupational Therapist                  Therapy Charges for Today       Code Description Service Date Service Provider Modifiers Qty    92752852040  OT THER PROC EA 15 MIN 8/31/2023 Margie Alba OTR GO 1    [id number]  OT SELF CARE/MGMT/TRAIN EA 15 MIN 8/31/2023 Margie Alba OTR GO 1    [id number]  OT EVAL LOW COMPLEXITY 2 8/31/2023 Margie Alba OTR GO 1               OT Discharge Summary  Anticipated Discharge Disposition (OT): home with assist, home with outpatient therapy services  Reason for Discharge: All goals achieved, Discharge from facility  Discharge Destination: Home with assist, Home with outpatient services    [place] [place]  8/31/2023

## 2023-08-31 NOTE — BRIEF OP NOTE
TOTAL SHOULDER ARTHROPLASTY  Progress Note    Ellyn Sanchez  8/31/2023    Pre-op Diagnosis:   Primary osteoarthritis of left shoulder [M19.012]       Post-Op Diagnosis Codes:     * Primary osteoarthritis of left shoulder [M19.012]    Procedure/CPT® Codes:        Procedure(s):  TOTAL SHOULDER ARTHROPLASTY, biceps tenodesis        SURGICAL APPROACH: Deltopectoral    SURGICAL TECHNIQUE: Tenotomy      Surgeon(s):  Reese Hoskins MD    Anesthesia: General with Block    Staff:   Circulator: Hailey Ying RN  Scrub Person: Suze Modi  Vendor Representative: Daniel Hayden Flynn L  Assistant: Michelle Ramirez CSA  Assistant: Michelle Ramirez CSA      Estimated Blood Loss: 100ml    Urine Voided: * No values recorded between 8/31/2023 12:00 AM and 8/31/2023 11:24 AM *    Specimens:                None          Drains: * No LDAs found *    Findings: see dictation        Complications: none    Assistant: Michelle Ramirez CSA  was responsible for performing the following activities: Retraction and their skilled assistance was necessary for the success of this case.    Reese Hoskins MD     Date: 8/31/2023  Time: 1242

## 2023-08-31 NOTE — PLAN OF CARE
Goal Outcome Evaluation:  Plan of Care Reviewed With: patient, friend           Outcome Evaluation: Pt POD#0 L TSA and seen by OT for UE HEP review and demo and ed/demo ADL tasks and sling use.  Pt plans to d/c today to friends home who is present for education and prepared to assist as needed at d/c.  No further skilled acute care OT needs.      Anticipated Discharge Disposition (OT): home with assist (OP PT per MD order at follow up visit)

## 2023-08-31 NOTE — ANESTHESIA PREPROCEDURE EVALUATION
Anesthesia Evaluation     no history of anesthetic complications:   NPO Solid Status: > 8 hours  NPO Liquid Status: > 2 hours           Airway   Mallampati: II  Neck ROM: full  no difficulty expected  Dental - normal exam     Pulmonary - normal exam   (+) a smoker Former,  (-) COPD, asthma, sleep apnea    PE comment: nonlabored  Cardiovascular - negative cardio ROS and normal exam  Exercise tolerance: good (4-7 METS)    Rhythm: regular  Rate: normal    (-) hypertension, valvular problems/murmurs, past MI, CAD, dysrhythmias, angina      Neuro/Psych- negative ROS  (-) seizures, TIA, CVA  GI/Hepatic/Renal/Endo    (+) renal disease stones, thyroid problem hypothyroidism  (-) GERD, liver disease, diabetes    Musculoskeletal     (+) arthralgias  Abdominal    Substance History      OB/GYN          Other   arthritis,                   Anesthesia Plan    ASA 2     general     (ISB for post-op pain PSR)  intravenous induction     Anesthetic plan, risks, benefits, and alternatives have been provided, discussed and informed consent has been obtained with: patient.    CODE STATUS:

## 2023-08-31 NOTE — ANESTHESIA PROCEDURE NOTES
Peripheral Block      Patient reassessed immediately prior to procedure    Patient location during procedure: pre-op  Start time: 8/31/2023 11:08 AM  Stop time: 8/31/2023 11:12 AM  Reason for block: at surgeon's request and post-op pain management  Performed by  Anesthesiologist: Elmer Saavedra MD  Preanesthetic Checklist  Completed: patient identified, IV checked, site marked, risks and benefits discussed, surgical consent, monitors and equipment checked, pre-op evaluation and timeout performed  Prep:  Sterile barriers:cap, gloves, mask, washed/disinfected hands and alcohol skin prep  Prep: ChloraPrep  Patient monitoring: blood pressure monitoring, continuous pulse oximetry and EKG  Procedure    Sedation: yes    Guidance:ultrasound guided    ULTRASOUND INTERPRETATION.  Using ultrasound guidance a 21 G gauge needle was placed in close proximity to the brachial plexus nerve, at which point, under ultrasound guidance anesthetic was injected in the area of the nerve and spread of the anesthesia was seen on ultrasound in close proximity thereto.  There were no abnormalities seen on ultrasound; a digital image was taken; and the patient tolerated the procedure with no complications. Images:still images obtained, printed/placed on chart    Laterality:left  Block Type:interscalene  Injection Technique:single-shotNeedle Gauge:21 G  Loss of resistance: normal.    Medications Used: bupivacaine liposome (EXPAREL) 1.3 % injection - Infiltration   10 mL - 8/31/2023 11:12:00 AM  bupivacaine-EPINEPHrine PF (MARCAINE w/EPI) 0.25% -[id number] injection - Injection   20 mL - 8/31/2023 11:12:00 AM      Medications  Comment:Ultrasound Interpretation:  Ultrasound guidance utilized for visualization of needle approach to brachial plexus at interscalene level and verification of local anesthetic disbursement to surrounding tissues. Photo printed and placed on chart for reference.    Post Assessment  Injection Assessment: negative  aspiration for heme, no paresthesia on injection and incremental injection  Patient Tolerance:comfortable throughout block  Complications:no

## 2023-08-31 NOTE — ANESTHESIA PROCEDURE NOTES
Airway  Urgency: elective    Date/Time: 8/31/2023 11:35 AM  Airway not difficult    General Information and Staff    Patient location during procedure: OR  Anesthesiologist: Luis Santos MD  CRNA/CAA: Morena Payne CRNA    Indications and Patient Condition  Indications for airway management: airway protection    Preoxygenated: yes  MILS not maintained throughout  Mask difficulty assessment: 2 - vent by mask + OA or adjuvant +/- NMBA    Final Airway Details  Final airway type: endotracheal airway      Successful airway: ETT  Cuffed: yes   Successful intubation technique: direct laryngoscopy  Facilitating devices/methods: intubating stylet and anterior pressure/BURP  Endotracheal tube insertion site: oral  Blade: Leonel  Blade size: 3  ETT size (mm): 7.0  Cormack-Lehane Classification: grade IIa - partial view of glottis  Placement verified by: chest auscultation and capnometry   Measured from: lips  ETT/EBT  to lips (cm): 21  Number of attempts at approach: 1  Assessment: lips, teeth, and gum same as pre-op and atraumatic intubation

## 2023-08-31 NOTE — OP NOTE
Orthopaedic Operative Note    Facility: [facility]    Patient: Ellyn Sanchez    Medical Record Number: [id number]    YOB: 1960    Dictating Surgeon: Reese Hoskins M.D.    Primary Care Physician: Irene Wilson APRN    Date of Operation:  8/31/2023    PREOPERATIVE DIAGNOSIS: Left shoulder end-stage osteoarthritis    POSTOPERATIVE DIAGNOSIS: Left shoulder end-stage osteoarthritis    PROCEDURES PERFORMED:    1.  Left total shoulder arthroplasty    2.  Tenodesis of long head of biceps tendon    SURGEON: Reese Hoskins MD     ASSISTANT: Michelle Ramirez whose assistance was critical for help with patient positioning, suctioning and irrigation, retraction, manipulation of the extremity for insertion of the implants, wound closure and application of the bandages.  Her assistance was critical to the success of this case.      ANESTHESIA: Regional followed General solution    SPECIMEN: None.     COMPLICATION: None.     ESTIMATED BLOOD LOSS: Less than 150 mL.     IMPLANTS:     1. Biomet 11 micro comprehensive stem with size 42 x 21 Versa-Dial humeral head    2. Size 4 glenoid with central trabecular metal peg cemented with 1 batch of [place] bone cement.     INDICATIONS: Ms. Beck had a long history of left shoulder pain which has been progressively worsening. The patient has long-standing osteoarthritis which has been nonresponsive to conservative treatment.    INFORMED CONSENT:  The risks, benefits, and alternatives to a total shoulder arthroplasty were discussed with the patient in detail. The patient acknowledged understanding the information and consented to proceed.  I explained that surgical risks include infection, hematoma, hardware related complications including failure of fixation, loosening, fracture, subscapularis repair failure and/or rotator cuff tear necessitating revision surgery, persistent pain and/or loss of motion, iatrogenic nerve and/or blood vessel  injury resulting in permanent weakness, numbness or dysfunction, DVT, PE, positioning related neuropraxia, and anesthesia related complications resulting in death.  I did explain the possible need for reverse arthroplasty depending upon the degree of retroversion and the status of his rotator cuff at the time of his surgery.  I further explained the risks inherent to reverse arthroplasty as compared to a standard total shoulder.  Specifically, we discussed the risks of notching, glenoid loosening, instability, and traction related neuropraxia, any of which could result in persistent pain or problems requiring further surgery.     DESCRIPTION OF PROCEDURE: The patient and operative site were identified in the preoperative holding area. The surgical site was marked. Following this, adequate regional anesthesia of the left upper extremity was administered. The patient was then taken to the operating room and placed in the supine position. Adequate general anesthesia was administered and then the patient was repositioned on the operating table in the modified beach chair position. The head and neck were placed in neutral alignment and all bony prominences were carefully padded and protected. Once we had the patient appropriately positioned, a timeout was taken. Preoperative antibiotics were administered.     I began the procedure by cleaning the extremity with an alcohol solution, a Hibiclens scrub was performed, and then the extremity was prepped with ChloraPrep x2. I allowed those to dry for 3 minutes before the draping procedure was carried out. Next, an approximately 8 cm incision was fashioned over the anterior aspect of the shoulder centered over the deltopectoral interval. Full-thickness medial and lateral skin flaps were developed and the cephalic vein dissected out. This structure was retracted laterally and kept protected throughout the duration of the case. The underlying clavipectoral fascia was divided and  the strap muscles were retracted medially. The anterior circumflex vessels were suture-ligated with 3-0 silk ties. The subscapularis was then tagged and released off the lesser tuberosity, careful to leave a small cuff of tissue to allow for a later anatomic repair of that structure.     The long head of the biceps was dissected out of the groove. This was released off of its attachment to the supraglenoid tubercle and then the diseased intraarticular portion of the biceps removed. The biceps was then tenodesed to the pectoralis tendon using two #2 MaxBraid sutures.     Once I completed that process, I then redirected my attention to the humeral side. There were large osteophytes surrounding the periphery of the head. These were removed at this point with a curved osteotome and rongeur. Once I completed that process, I had good exposure of the head. The cuff was intact and so I determined that we could proceed with a total shoulder arthroplasty. The cutting guide for the head cut was inserted. This was pinned into position at 30 degrees of retroversion as referenced off the forearm. The head cut was then carried out in typical fashion, careful to preserve the rotator cuff attachments during the cutting. The cut portion of the head was taken to the back table and measured. It measured a 42 in diameter.  Having completed the head cut and removal of the osteophytes, I then directed my attention to the glenoid. I dissected out the axillary nerve and made sure I had that structure identified and protected. Once the nerve was identified and protected, the subscapularis mobilization was performed. I was able to get a 360 degree release of the subscapularis and excellent mobilization of that structure without jeopardizing the axillary nerve.      Once I had completed that process, the glenoid was exposed. The superior, anterior, and inferior glenoid labral tissues were carefully removed to expose the glenoid. I measured it.  It measured a size 4. I pinned the center pin and then reamed and prepared the glenoid in typical fashion.     I had my assistant mix one batch of bone cement on the back table using current cement mixing technique. Once we completed that process and I drilled the 3 peg holes as well as the central hole for the central trabecular metal peg, the implant was impacted into position with cement in the peg holes and on the back of the implant.  This seated well and was firmly secure. I held that in place until the cement had cured. The excess extruded cement was removed with a [place] elevator. Next, I directed my attention back to the humerus. The humerus was reamed and broached up to a 11 micro broach where good fill was achieved. I elected to go with a size 11 stem. I drilled 2 drill holes in the lesser tuberosity and 2 transosseous sutures were placed through those to allow for a combined transosseous/transtendinous repair of the subscapularis. The implant was impacted into position. The stem seated well.      I then trialed with multiple head components.  The 42 x 21 fit very well and demonstrated excellent motion and stability. I could translate the humeral head roughly 50% posteriorly and 50% inferiorly and reduce right back onto the glenoid. Again, it seemed to fit very well and I was satisfied that was the appropriate size for implantation in this case.     The trial head component was removed and the final component was impacted into position. I took care to make sure the [place] taper was fully secure before reducing the shoulder and again carrying it through a range of motion. Again, the shoulder demonstrated excellent motion and stability. Next, I irrigated out with 500 mL of a Betadine-containing saline solution followed by 1 L of sterile saline mixed with bacitracin via pulsatile lavage.     I placed the arm in about 30 degrees of external rotation. The subscapularis repair was performed in the typical fashion  using the previously placed transosseous sutures as well as multiple transtendinous sutures. Two sutures were used to close down the defect in the rotator interval as well. I was able to get an excellent repair in the subscapularis without any significant tension. The shoulder demonstrated good motion and stability. Therefore, I directed my attention to final closure. I confirmed the axillary nerve was intact at this point and then I irrigated out with another 2 L of sterile saline via pulsatile lavage. I made sure we had good hemostasis.      The wound was then sequentially closed in layers using Vicryl stitches to the deep tissues and a running subcuticular Monocryl stitch followed by Steri-Strips for the skin. Sterile dressings were applied to the wound and the drapes withdrawn. The patient tolerated the procedure well. There were no complications. She was taken to the recovery room in good condition. Her arm was placed in a sling prior to transfer to the recovery room.     Reese Hoskins M.D.  8/31/2023    cc: Irene Wilson APRN

## 2023-08-31 NOTE — ANESTHESIA POSTPROCEDURE EVALUATION
Patient: Ellyn Sanchez    Procedure Summary       Date: 08/31/23 Room / Location:  [facility] [address] [place] [address] [facility]    Anesthesia Start: 1126 Anesthesia Stop: 1308    Procedure: TOTAL SHOULDER ARTHROPLASTY (Left: Shoulder) Diagnosis:       Primary osteoarthritis of left shoulder      (Primary osteoarthritis of left shoulder [M19.012])    Surgeons: Reese Hoskins MD Provider: Luis Santos MD    Anesthesia Type: general ASA Status: 2            Anesthesia Type: general    Vitals  Vitals Value Taken Time   /79 08/31/23 1334   Temp 36.3 øC (97.4 øF) 08/31/23 1305   Pulse 80 08/31/23 1350   Resp 18 08/31/23 1330   SpO2 94 % 08/31/23 1345   Vitals shown include unvalidated device data.        Post Anesthesia Care and Evaluation    Patient location during evaluation: PACU  Patient participation: complete - patient participated  Level of consciousness: awake  Pain management: adequate    Airway patency: patent  Anesthetic complications: No anesthetic complications  PONV Status: none  Cardiovascular status: acceptable  Respiratory status: acceptable  Hydration status: acceptable    Comments: Patient seen and examined postoperatively; vital signs stable; SpO2 greater than or equal to 90%; cardiopulmonary status stable; nausea/vomiting adequately controlled; pain adequately controlled; no apparent anesthesia complications; patient discharged from anesthesia care when discharge criteria were met

## 2023-08-31 NOTE — DISCHARGE INSTRUCTIONS
What to expect after a Nerve Block    Nerve blocks administered to block pain affect many types of nerves, including those nerves that control movement, pain, and normal sensation. Following a nerve block, you may notice some bruising at the site where the block was given. You may experience sensations such as: numbness of the affected area or limb, tingling, heaviness (that is the limb feels heavy to you), weakness or inability to move the affected arm or leg, or a feeling as if your arm or leg has “fallen asleep.”     A nerve block can last from 2 to 36 hours depending on the medications used.  Usually the weakness wears off first followed by the tingling and heaviness. As the block wears off, you may begin to notice pain; however, this sequence of events may occur in any order. Typically, you will be able to move your limb before you will feel it. Once a nerve block begins to wear off, the effects are usually completely gone within 60 minutes.  If you experience continued side effects that you believe are block related for longer than 48 hours, please call your healthcare provider. Please see block-specific instructions below.    Instructions for any block involving the shoulder or arm  If you have had any kind of shoulder/arm block, you will go home with your arm in a sling. Wear the sling until the block has completely worn off. You may be required to wear it for a longer period of time per your surgeon’s recommendations.  If you have had a shoulder/arm block, it is a good idea to sleep on a recliner with pillows under your arm.    You may experience symptoms such as:  Shortness of breath  Hoarseness   Blurry vision  Unequal pupils  Drooping of your face on the same side as the block was performed    These are side effects associated with this kind of block and should go away within 12 hours.    Note: If you have severe or prolonged shortness of breath, please seek medical assistance as soon as possible.      Protection of a “blocked” arm or leg (limb)  After a nerve block, you cannot feel pain, pressure, or extremes of temperature in the affected limb. And because of this, your blocked limb is at more risk for injury. For example, it is possible to burn your limb on an extremely hot surface without feeling it.     When resting, it is important to reposition your limb periodically to avoid prolonged pressure on it. This may require the use of pillows and padding.    While sleeping, you should avoid rolling onto the affected limb or putting too much pressure on it.     If you have a cast or tight dressing, check the color of your fingers or toes of the affected limb. Call your surgeon if they look discolored (that is, dusky, dark colored).    Use caution in cold weather. Cover your limb appropriately to protect it from the cold.      Pain Management:    Your surgeon will give you a prescription for pain medication. Begin taking this before the nerve block wears off. Bear in mind that sometimes the block can wear off in the middle of the night.      Pendulum Exercises for Post Op Shoulder Surgery      Lean over with your good arm supported on a table or chair.  Relax the injured arm and allow it to hang straight down at your side.  Slowly begin to swing the relaxed arm back and forth.  Then swing it side to side.  Next, move it in a [place]. Now,  reverse the direction.        Generally, you should spend about 5 minutes doing this exercise.  It should be done 2-3 times daily or as directed by your physician.

## 2023-09-01 ENCOUNTER — TELEPHONE (OUTPATIENT)
Dept: ORTHOPEDIC SURGERY | Facility: CLINIC | Age: 63
End: 2023-09-01
Payer: COMMERCIAL

## 2023-09-01 NOTE — TELEPHONE ENCOUNTER
Postop follow up call.  I spoke to patient.  Reports her nerve block Is still working.  I instructed her to postpone pendulum exercises until she has full sensation return to the entire arm.  We discussed postop care instructions.  She is interested in attending physical therapy at [facility].  She is requesting the order and protocol be sent by fax to them at [phone].  I informed her we will take care of that today.  I encouraged her to call with any questions or concerns prior to her follow-up appointment with Dr. Hoskins.  She verbalized understanding of all we discussed and appreciated the call.

## 2023-09-05 ENCOUNTER — TELEPHONE (OUTPATIENT)
Dept: ORTHOPEDIC SURGERY | Facility: HOSPITAL | Age: 63
End: 2023-09-05
Payer: COMMERCIAL

## 2023-09-05 NOTE — TELEPHONE ENCOUNTER
Attempted to reach Ms. Sanchez to see how she is doing as she is POD 4 LTS. Message left at this time.

## 2023-09-06 DIAGNOSIS — Z96.612 HISTORY OF LEFT SHOULDER REPLACEMENT: ICD-10-CM

## 2023-09-06 RX ORDER — ONDANSETRON 4 MG/1
4 TABLET, FILM COATED ORAL EVERY 8 HOURS PRN
Qty: 12 TABLET | Refills: 0 | Status: SHIPPED | OUTPATIENT
Start: 2023-09-06

## 2023-09-06 RX ORDER — HYDROCODONE BITARTRATE AND ACETAMINOPHEN 7.5; 325 MG/1; MG/1
1 TABLET ORAL EVERY 4 HOURS PRN
Qty: 42 TABLET | Refills: 0 | Status: SHIPPED | OUTPATIENT
Start: 2023-09-06

## 2023-09-11 ENCOUNTER — TELEPHONE (OUTPATIENT)
Dept: ORTHOPEDIC SURGERY | Facility: HOSPITAL | Age: 63
End: 2023-09-11
Payer: COMMERCIAL

## 2023-09-11 NOTE — TELEPHONE ENCOUNTER
Called and spoke with Ms. Sanchez to see how she is doing as she is 2 weeks SP LTS. She said she is doing fine. She has started with OP PT and making great progress. She has a F/U with the MD office later this week, Ms. Sanchez doesn't have any questions for me at this time. She has my contact information should she need anything.

## 2023-09-13 ENCOUNTER — OFFICE VISIT (OUTPATIENT)
Dept: ORTHOPEDIC SURGERY | Facility: CLINIC | Age: 63
End: 2023-09-13
Payer: COMMERCIAL

## 2023-09-13 VITALS — HEIGHT: 62 IN | TEMPERATURE: 97.8 F | BODY MASS INDEX: 26.74 KG/M2 | WEIGHT: 145.3 LBS

## 2023-09-13 DIAGNOSIS — Z96.612 HISTORY OF LEFT SHOULDER REPLACEMENT: Primary | ICD-10-CM

## 2023-09-13 NOTE — PROGRESS NOTES
"Ellyn Sanchez : 1960 MRN: [id number] DATE: 2023    DIAGNOSIS:  2 week follow up left shoulder arthroplasty      SUBJECTIVE:  Patient returns today for 2 week follow up of left shoulder replacement. Patient reports doing well with no unusual complaints.      OBJECTIVE:    Temp 97.8 °F (36.6 °C)   Ht 157.5 cm (62\")   Wt 65.9 kg (145 lb 4.8 oz)   BMI 26.58 kg/m²     Exam:  The incision is well approximated.  No erythema or drainage. Shoulder moves fluidly with pendulums.  The arm is soft and nontender.  Intact motor and sensory function in the lower arm and hand.  Palpable radial pulse.    DIAGNOSTIC STUDIES    Xrays: AP and scapular Y views of the left shoulder are ordered and reviewed for evaluation of the recent shoulder replacement.  The x-rays demonstrate a well positioned, well aligned replacement without complicating factors noted.  In comparison with previous films, there has been no change.    ASSESSMENT: 2 week follow up left shoulder replacement.    PLAN:   1.  PT per protocol  2.  Continue sling until next visit.  3.  Counseled patient about appropriate activity modifications and restrictions, including no driving at this point.    Reese Hoskins MD   "

## 2023-09-15 DIAGNOSIS — Z96.612 HISTORY OF LEFT SHOULDER REPLACEMENT: ICD-10-CM

## 2023-09-15 RX ORDER — HYDROCODONE BITARTRATE AND ACETAMINOPHEN 7.5; 325 MG/1; MG/1
1 TABLET ORAL EVERY 4 HOURS PRN
Qty: 42 TABLET | Refills: 0 | Status: SHIPPED | OUTPATIENT
Start: 2023-09-15

## 2023-09-15 RX ORDER — DOCUSATE SODIUM 100 MG/1
100 CAPSULE, LIQUID FILLED ORAL 2 TIMES DAILY
Qty: 60 CAPSULE | Refills: 0 | Status: SHIPPED | OUTPATIENT
Start: 2023-09-15

## 2023-09-15 RX ORDER — ACETAMINOPHEN 325 MG/1
650 TABLET ORAL 2 TIMES DAILY PRN
Qty: 60 TABLET | Refills: 0 | Status: SHIPPED | OUTPATIENT
Start: 2023-09-15

## 2023-09-15 NOTE — TELEPHONE ENCOUNTER
From: Ellyn Sanchez  To: Office of Reese Hoskins  Sent: 9/15/2023 12:34 PM EDT  Subject: Medication Renewal Request    Refills have been requested for the following medications:     acetaminophen (TYLENOL) 325 MG tablet [Reese Hoskins]     docusate sodium (COLACE) 100 MG capsule [Reese Hoskins]     HYDROcodone-acetaminophen (NORCO) 7.5-325 MG per tablet [Reese Hoskins]    Preferred pharmacy: [id number] [place] [address] AT [facility] [address] - [phone]  - [phone]  Delivery method: Pickup

## 2023-09-20 DIAGNOSIS — Z96.612 HISTORY OF LEFT SHOULDER REPLACEMENT: ICD-10-CM

## 2023-09-20 RX ORDER — HYDROCODONE BITARTRATE AND ACETAMINOPHEN 7.5; 325 MG/1; MG/1
1 TABLET ORAL EVERY 4 HOURS PRN
Qty: 42 TABLET | Refills: 0 | Status: SHIPPED | OUTPATIENT
Start: 2023-09-20

## 2023-09-22 ENCOUNTER — TELEPHONE (OUTPATIENT)
Dept: ORTHOPEDIC SURGERY | Facility: CLINIC | Age: 63
End: 2023-09-22
Payer: COMMERCIAL

## 2023-09-22 NOTE — TELEPHONE ENCOUNTER
Update:  I tried to call again and it says the pharmacy is temporarily closed.    Patient's daughter calling on mom's cell phone to help get prescription figured out.    The pharmacy wouldn't fill rx because it was a month's  supply.  I checked and she had a 7 day supply.  I am not sure why they won't fill it.    I tried to call pharmacy.  They are on lunch break.      I called daughter back.  She is asking if I will continue to try and then call her back once I learn what the problem is

## 2023-09-25 DIAGNOSIS — Z96.612 HISTORY OF LEFT SHOULDER REPLACEMENT: ICD-10-CM

## 2023-09-26 RX ORDER — OXYCODONE HYDROCHLORIDE AND ACETAMINOPHEN 5; 325 MG/1; MG/1
1 TABLET ORAL EVERY 4 HOURS PRN
Qty: 42 TABLET | Refills: 0 | Status: SHIPPED | OUTPATIENT
Start: 2023-09-26

## 2023-09-26 RX ORDER — OXYCODONE HYDROCHLORIDE AND ACETAMINOPHEN 5; 325 MG/1; MG/1
TABLET ORAL
Qty: 42 TABLET | Refills: 0 | Status: CANCELLED | OUTPATIENT
Start: 2023-09-26

## 2023-09-26 RX ORDER — HYDROCODONE BITARTRATE AND ACETAMINOPHEN 7.5; 325 MG/1; MG/1
1 TABLET ORAL EVERY 4 HOURS PRN
Qty: 42 TABLET | Refills: 0 | Status: SHIPPED | OUTPATIENT
Start: 2023-09-26

## 2023-09-26 RX ORDER — OXYCODONE HYDROCHLORIDE AND ACETAMINOPHEN 5; 325 MG/1; MG/1
1 TABLET ORAL EVERY 4 HOURS PRN
Qty: 50 TABLET | Refills: 0 | Status: SHIPPED | OUTPATIENT
Start: 2023-09-26

## 2023-09-26 RX ORDER — OXYCODONE HYDROCHLORIDE AND ACETAMINOPHEN 5; 325 MG/1; MG/1
1 TABLET ORAL EVERY 4 HOURS PRN
Qty: 42 TABLET | Refills: 0 | Status: CANCELLED | OUTPATIENT
Start: 2023-09-26

## 2023-10-09 ENCOUNTER — OFFICE VISIT (OUTPATIENT)
Dept: ORTHOPEDIC SURGERY | Facility: CLINIC | Age: 63
End: 2023-10-09
Payer: COMMERCIAL

## 2023-10-09 VITALS — BODY MASS INDEX: 26.68 KG/M2 | TEMPERATURE: 98.2 F | WEIGHT: 145 LBS | HEIGHT: 62 IN

## 2023-10-09 DIAGNOSIS — Z96.612 STATUS POST TOTAL SHOULDER ARTHROPLASTY, LEFT: ICD-10-CM

## 2023-10-09 DIAGNOSIS — M19.011 ARTHRITIS OF RIGHT SHOULDER REGION: Primary | ICD-10-CM

## 2023-10-11 NOTE — PROGRESS NOTES
Patient: Ellyn Sanchez    YOB: 1960    Medical Record Number: [id number]    Chief Complaints:  New problem right shoulder pain, Follow up left shoulder pain    History of Present Illness:     63 y.o. female patient who presents with a complaint of right shoulder pain.  She has seen both Dr. Wan and Jose for this problem.  This has been an ongoing problem since at least 2015.  Pain is moderate, constant and aching. She reports difficulty with routine daily activities at this point.  The symptoms have been getting progressively worse. She denies any alleviating factors. She denies any shooting pain down the arm, weakness, numbness or paresthesias.     She is 6 weeks out from her left total shoulder replacement.  Reports she is doing well with no unusual complaints.     Allergies:   Allergies   Allergen Reactions    Celecoxib GI Intolerance       Home Medications:    Current Outpatient Medications:     acetaminophen (TYLENOL) 325 MG tablet, Take 2 tablets by mouth 2 (Two) Times a Day As Needed for Mild Pain., Disp: 60 tablet, Rfl: 0    azelastine (ASTEPRO) 0.15 % solution nasal spray, 2 sprays 2 (Two) Times a Day., Disp: , Rfl:     docusate sodium (COLACE) 100 MG capsule, Take 1 capsule by mouth 2 (Two) Times a Day., Disp: 60 capsule, Rfl: 0    fluticasone (FLONASE) 50 MCG/ACT nasal spray, 1 spray into the nostril(s) as directed by provider Daily As Needed. Spray in each nostril., Disp: , Rfl:     gabapentin (NEURONTIN) 600 MG tablet, Take 1 tablet by mouth every night at bedtime., Disp: , Rfl:     levothyroxine (SYNTHROID, LEVOTHROID) 50 MCG tablet, Take 1 tablet by mouth 3 (Three) Times a Week. MON WED FRI, Disp: , Rfl:     levothyroxine (SYNTHROID, LEVOTHROID) 50 MCG tablet, Take 1.5 tablets by mouth 4 (Four) Times a Week. TUES  THUR   SAT  SUN, Disp: , Rfl:     naproxen sodium (ALEVE) 220 MG tablet, Take 2 tablets by mouth Every Morning. To stop before surgery, Disp: , Rfl:     ondansetron  (Zofran) 4 MG tablet, Take 1 tablet by mouth Every 8 (Eight) Hours As Needed for Nausea or Vomiting., Disp: 12 tablet, Rfl: 0    oxyCODONE-acetaminophen (PERCOCET) 5-325 MG per tablet, Take 1 tablet by mouth Every 4 (Four) Hours As Needed for Moderate Pain., Disp: 50 tablet, Rfl: 0    traMADol (ULTRAM) 50 MG tablet, Take 1 tablet by mouth Daily., Disp: , Rfl:     valACYclovir (VALTREX) 500 MG tablet, Take  by mouth Every Morning. Take as directed for cold sores and genital herpes., Disp: , Rfl:     Past Medical History:   Diagnosis Date    Arthritis     Disease of thyroid gland     Kidney stone     Periarthritis of shoulder     Tear of meniscus of knee 2022       Past Surgical History:   Procedure Laterality Date    APPENDECTOMY      COLONOSCOPY      KIDNEY STONE SURGERY  2017    TOTAL SHOULDER ARTHROPLASTY Left 2023    Procedure: TOTAL SHOULDER ARTHROPLASTY;  Surgeon: Reese Hoskins MD;  Location: [facility] OR [facility] [place];  Service: Orthopedics;  Laterality: Left;       Social History     Occupational History    Not on file   Tobacco Use    Smoking status: Former     Packs/day: 1.50     Years: 10.00     Additional pack years: 0.00     Total pack years: 15.00     Types: Cigarettes     Quit date: 2010     Years since quittin.2     Passive exposure: Past    Smokeless tobacco: Never   Vaping Use    Vaping Use: Never used   Substance and Sexual Activity    Alcohol use: Yes     Alcohol/week: 2.0 standard drinks of alcohol     Types: 2 Drinks containing 0.5 oz of alcohol per week    Drug use: Never    Sexual activity: Not Currently     Partners: Male     Birth control/protection: Pill, None      Social History     Social History Narrative    Not on file       Family History   Problem Relation Age of Onset    Dementia Mother     Diabetes Mother     Heart disease Mother     COPD Father         with Lewy bodies    Diabetes Father     Heart disease Father     Hypertension Father     Obesity Father          "Morbid    Arthritis Father     Cancer Father         big toe    Diabetes Sister     Heart disease Brother     Diabetes Brother     Diabetes Maternal Grandmother     Malig Hyperthermia Neg Hx        Review of Systems:      Constitutional: Denies fever, shaking or chills   Eyes: Denies change in visual acuity   HEENT: Denies nasal congestion or sore throat   Respiratory: Denies cough or shortness of breath   Cardiovascular: Denies chest pain or edema  Endocrine: Denies tremors, palpitations, intolerance of heat or cold, polyuria, polydipsia.  GI: Denies abdominal pain, nausea, vomiting, bloody stools or diarrhea  : Denies frequency, urgency, incontinence, retention, or nocturia.  Musculoskeletal: Denies numbness, tingling or loss of motor function   Integument: Denies rash, lesion or ulceration   Neurologic: Denies headache or focal weakness, deficits  Heme: Denies spontaneous or excessive bleeding, epistaxis, hematuria, melena, fatigue, enlarged or tender lymph nodes.      All other pertinent positives and negatives as noted above in HPI.    Physical Exam:   63 y.o. female  Vitals:    10/09/23 1353   Temp: 98.2 øF (36.8 øC)   TempSrc: Temporal   Weight: 65.8 kg (145 lb)   Height: 157.5 cm (62\")       General:  Patient is awake and alert.  Appears in no acute distress or discomfort.    Psych:  Affect and demeanor are appropriate.    Eyes:  Conjunctiva and sclera appear grossly normal.  Eyes track well and EOM seem to be intact.    Ears:  No gross abnormalities.  Hearing adequate for the exam.    Cardiovascular:  Regular rate and rhythm.    Lungs:  Good chest expansion.  Breathing unlabored.    Spine:  Neck appears grossly normal.  No palpable masses or adenopathy.  Good motion.  Spurling's maneuver is negative for any shoulder or arm symptoms.    Extremities:      Right shoulder is examined.  Skin is benign.  No obvious gross abnormalities.  No palpable masses or adenopathy.  Moderate tenderness noted over anterior " glenohumeral joint and rotator interval.  Motion is to 145ø of forward elevation, 55ø external rotation, 130ø horizontal abduction, internal rotation to T11.  Crepitus with range of motion.  No evident instability.  Rotator cuff strength seems to be well preserved but the exam is limited due to discomfort with resisted active motion.  Good motor and sensory function in her lower arm and hand.  Palpable radial pulse.  Brisk capillary refill.    Left shoulder is examined:  The incision is well healed. No erythema or drainage. Shoulder moves fluidly with pendulums.  Motion is on track per protocol.  The arm is soft and nontender.  Good motor and sensory function.  Palpable distal pulses.          Radiology:  Previous right shoulder x-rays from 07/14/2023 are reviewed.  The x-rays show advanced degenerative osteoarthritis with space narrowing, large osteophyte formation, and subchondral sclerosis.    AP and scapular Y views of the left shoulder are ordered and reviewed for evaluation of shoulder replacement.  They demonstrate a well positioned, well aligned replacement without complicating factors noted.  In comparison with previous films there has been no change.    Assessment/Plan:  1.  Right shoulder osteoarthritis  2.  6 weeks status post left shoulder replacement    Right shoulder:  We discussed treatment options in detail including conservative treatment versus surgical options.  Regarding conservative treatment, we discussed appropriate activity modifications, anti-inflammatories, injections, and physical therapy.  We also discussed the option of an arthroplasty and all that would entail.  She verbalized understanding of all we discussed and has opted to proceed with shoulder replacement surgery.  I will inform Dr. Hoskins of her decision.  Our surgery scheduler will contact her to make the necessary arrangements.  Patient will need a preop appointment with Dr. Hoskins.     Left shoulder:   Continue PT per  protocol.  Discontinue sling and begin working on progressing ROM as tolerated.  Counseled patient about appropriate activity modifications and restrictions.  Released to drive at this point.  Patient to remain off work at least until her follow up appointment with Dr. Hoskins in 6 weeks.     VAMSHI Harrington    10/09/2023    CC to Irene Wilson APRN

## 2023-10-12 ENCOUNTER — PREP FOR SURGERY (OUTPATIENT)
Dept: OTHER | Facility: HOSPITAL | Age: 63
End: 2023-10-12
Payer: COMMERCIAL

## 2023-10-12 DIAGNOSIS — M19.011 ARTHRITIS OF RIGHT SHOULDER REGION: Primary | ICD-10-CM

## 2023-10-12 DIAGNOSIS — Z96.612 STATUS POST TOTAL SHOULDER ARTHROPLASTY, LEFT: Primary | ICD-10-CM

## 2023-10-12 RX ORDER — CEFAZOLIN SODIUM 2 G/100ML
2 INJECTION, SOLUTION INTRAVENOUS ONCE
OUTPATIENT
Start: 2023-10-12 | End: 2023-10-12

## 2023-10-12 RX ORDER — VANCOMYCIN HYDROCHLORIDE 1 G/200ML
15 INJECTION, SOLUTION INTRAVENOUS ONCE
OUTPATIENT
Start: 2023-10-12 | End: 2023-10-12

## 2023-10-12 RX ORDER — MELOXICAM 15 MG/1
15 TABLET ORAL ONCE
OUTPATIENT
Start: 2023-10-12 | End: 2023-10-12

## 2023-10-12 RX ORDER — ACETAMINOPHEN 500 MG
1000 TABLET ORAL ONCE
OUTPATIENT
Start: 2023-10-12 | End: 2023-10-12

## 2023-10-12 RX ORDER — PREGABALIN 75 MG/1
150 CAPSULE ORAL ONCE
OUTPATIENT
Start: 2023-10-12 | End: 2023-10-12

## 2023-10-13 RX ORDER — OXYCODONE HYDROCHLORIDE AND ACETAMINOPHEN 5; 325 MG/1; MG/1
1 TABLET ORAL EVERY 4 HOURS PRN
Qty: 50 TABLET | Refills: 0 | Status: SHIPPED | OUTPATIENT
Start: 2023-10-13

## 2023-10-17 PROBLEM — M19.011 ARTHRITIS OF RIGHT SHOULDER REGION: Status: ACTIVE | Noted: 2023-10-12

## 2023-11-01 DIAGNOSIS — Z96.612 STATUS POST TOTAL SHOULDER ARTHROPLASTY, LEFT: ICD-10-CM

## 2023-11-01 RX ORDER — OXYCODONE HYDROCHLORIDE AND ACETAMINOPHEN 5; 325 MG/1; MG/1
1 TABLET ORAL EVERY 4 HOURS PRN
Qty: 50 TABLET | Refills: 0 | Status: SHIPPED | OUTPATIENT
Start: 2023-11-01

## 2023-11-01 RX ORDER — ACETAMINOPHEN 325 MG/1
650 TABLET ORAL 2 TIMES DAILY PRN
Qty: 60 TABLET | Refills: 0 | Status: SHIPPED | OUTPATIENT
Start: 2023-11-01

## 2023-11-20 ENCOUNTER — OFFICE VISIT (OUTPATIENT)
Dept: ORTHOPEDIC SURGERY | Facility: CLINIC | Age: 63
End: 2023-11-20
Payer: COMMERCIAL

## 2023-11-20 VITALS — WEIGHT: 150.3 LBS | BODY MASS INDEX: 27.66 KG/M2 | HEIGHT: 62 IN | TEMPERATURE: 98.3 F

## 2023-11-20 DIAGNOSIS — Z96.612 STATUS POST TOTAL SHOULDER ARTHROPLASTY, LEFT: Primary | ICD-10-CM

## 2023-11-20 DIAGNOSIS — Z09 SURGERY FOLLOW-UP: ICD-10-CM

## 2023-11-20 PROCEDURE — 73030 X-RAY EXAM OF SHOULDER: CPT | Performed by: ORTHOPAEDIC SURGERY

## 2023-11-20 PROCEDURE — 99024 POSTOP FOLLOW-UP VISIT: CPT | Performed by: ORTHOPAEDIC SURGERY

## 2023-11-20 NOTE — LETTER
November 20, 2023     Patient: Ellyn Sanchez   YOB: 1960   Date of Visit: 11/20/2023       To Whom It May Concern:    It is my medical opinion that Ellyn Sanchez should remain out of work until December 16, 2023 .           Sincerely,        Reese Hoskins MD    CC:   No Recipients

## 2023-11-20 NOTE — PROGRESS NOTES
"Ellyn Sanchez : 1960 MRN: [id number] DATE: 2023    DIAGNOSIS: 3 month follow up left shoulder arthroplasty      SUBJECTIVE:  Patient returns today for 3 month follow up of left shoulder replacement.  Patient reports doing well with no unusual complaints. Still in PT and reports making good progress.    OBJECTIVE:    Temp 98.3 °F (36.8 °C) (Temporal)   Ht 157.5 cm (62\")   Wt 68.2 kg (150 lb 4.8 oz)   BMI 27.49 kg/m²     Review of Systems negative except as above    Exam:  The incision is well healed. No effusion.  Range of motion:  .  ER 50.  IR T12. The arm is soft and nontender.  5-/5 strength with elevation and abduction.  Sensation intact distally.  Brisk cap refill.    DIAGNOSTIC STUDIES    Xrays: AP, scapular Y views of the left shoulder are ordered and reviewed for evaluation of shoulder replacement. They demonstrate a well positioned, well aligned replacement without complicating factors noted.  In comparison with previous films there has been no change.    ASSESSMENT: 3 month follow up left shoulder replacement.    PLAN:   1.  Continue appropriate activity modifications and restrictions as discussed  2.  Continue PT per protocol.  She needs to work on her strength.  3.  I explained that one can expect continued improvement in motion, function, and any residual discomfort for up to 1 year after surgery.  4.  Follow up at 1 year unless experiencing any new or concerning symptoms.  5.  Antibiotic prophylaxis discussed    Reese Hoskins MD    2023    "

## (undated) DEVICE — PREP SOL POVIDONE/IODINE BT 4OZ

## (undated) DEVICE — DRAPE,U/ SHT,SPLIT,PLAS,STERIL: Brand: MEDLINE

## (undated) DEVICE — 3M™ IOBAN™ 2 ANTIMICROBIAL INCISE DRAPE 6640EZ: Brand: IOBAN™ 2

## (undated) DEVICE — GLV SURG SIGNATURE ESSENTIAL PF LTX SZ8.5

## (undated) DEVICE — DRSNG TELFA PAD NONADH STR 1S 3X8IN

## (undated) DEVICE — SOL ISO/ALC 70PCT 4OZ

## (undated) DEVICE — GLV SURG SENSICARE PI MIC PF SZ7 LF STRL

## (undated) DEVICE — STCKNT IMPERV 12IN STRL

## (undated) DEVICE — GLV SURG BIOGEL LTX PF 7

## (undated) DEVICE — MAT FLR ABSORBENT LG 4FT 10 2.5FT

## (undated) DEVICE — DRAPE,SHOULDER,BEACH ULTRAGARD: Brand: MEDLINE

## (undated) DEVICE — STRIP,CLOSURE,WOUND,MEDI-STRIP,1/2X4: Brand: MEDLINE

## (undated) DEVICE — BNDG ELAS CO-FLEX SLF ADHR 4IN5YD LF STRL

## (undated) DEVICE — GLV SURG BIOGEL LTX PF 8 1/2

## (undated) DEVICE — MARKR SKIN W/RULR AND LBL

## (undated) DEVICE — APPL CHLORAPREP HI/LITE 26ML ORNG

## (undated) DEVICE — SYR SLP TP 10ML DISP

## (undated) DEVICE — GLV SURG BIOGEL LTX PF 6 1/2

## (undated) DEVICE — PATIENT RETURN ELECTRODE, SINGLE-USE, CONTACT QUALITY MONITORING, ADULT, WITH 9FT CORD, FOR PATIENTS WEIGING OVER 33LBS. (15KG): Brand: MEGADYNE

## (undated) DEVICE — CEMENT MIXING SYSTEM WITH FEMORAL BREAKWAY NOZZLE: Brand: REVOLUTION

## (undated) DEVICE — DRSNG SURESITE WNDW 4X4.5

## (undated) DEVICE — DUAL CUT SAGITTAL BLADE

## (undated) DEVICE — IMPLANTABLE DEVICE
Type: IMPLANTABLE DEVICE | Site: SHOULDER | Status: NON-FUNCTIONAL
Brand: COMPREHENSIVE® REVERSE SHOULDER
Removed: 2023-08-31

## (undated) DEVICE — SKIN PREP TRAY W/CHG: Brand: MEDLINE INDUSTRIES, INC.

## (undated) DEVICE — SOL IRR NACL 0.9PCT 3000ML

## (undated) DEVICE — SUT SILK 2/0 TIES 18IN A185H

## (undated) DEVICE — PK SHLDR OPN 40

## (undated) DEVICE — SUT VIC 2/0 CT2 27IN J269H